# Patient Record
Sex: FEMALE | Race: BLACK OR AFRICAN AMERICAN | NOT HISPANIC OR LATINO | Employment: STUDENT | URBAN - METROPOLITAN AREA
[De-identification: names, ages, dates, MRNs, and addresses within clinical notes are randomized per-mention and may not be internally consistent; named-entity substitution may affect disease eponyms.]

---

## 2017-02-16 ENCOUNTER — APPOINTMENT (OUTPATIENT)
Dept: RADIOLOGY | Facility: MEDICAL CENTER | Age: 27
End: 2017-02-16
Attending: EMERGENCY MEDICINE
Payer: MEDICAID

## 2017-02-16 ENCOUNTER — HOSPITAL ENCOUNTER (EMERGENCY)
Facility: MEDICAL CENTER | Age: 27
End: 2017-02-17
Attending: EMERGENCY MEDICINE
Payer: MEDICAID

## 2017-02-16 DIAGNOSIS — T14.8XXA MUSCLE STRAIN: ICD-10-CM

## 2017-02-16 PROCEDURE — 700102 HCHG RX REV CODE 250 W/ 637 OVERRIDE(OP): Performed by: EMERGENCY MEDICINE

## 2017-02-16 PROCEDURE — 99284 EMERGENCY DEPT VISIT MOD MDM: CPT

## 2017-02-16 PROCEDURE — A9270 NON-COVERED ITEM OR SERVICE: HCPCS | Performed by: EMERGENCY MEDICINE

## 2017-02-16 RX ORDER — IBUPROFEN 600 MG/1
600 TABLET ORAL ONCE
Status: COMPLETED | OUTPATIENT
Start: 2017-02-17 | End: 2017-02-16

## 2017-02-16 RX ADMIN — IBUPROFEN 600 MG: 600 TABLET, FILM COATED ORAL at 23:50

## 2017-02-16 ASSESSMENT — PAIN SCALES - GENERAL
PAINLEVEL_OUTOF10: 8
PAINLEVEL_OUTOF10: 8

## 2017-02-16 NOTE — ED AVS SNAPSHOT
Home Care Instructions                                                                                                                Senia Tarango   MRN: [id number]    Department:  [facility], Emergency Dept   Date of Visit:  2/16/2017            [facility], Emergency Dept    [address] [place] [address]    Phone:  [phone]      You were seen by     Perry Nye M.D.      Your Diagnosis Was     Muscle strain     T14.8       These are the medications you received during your hospitalization from 02/16/2017 2202 to 02/17/2017 0100     Date/Time Order Dose Route Action    02/16/2017 2350 ibuprofen (MOTRIN) tablet 600 mg 600 mg Oral Given      Follow-up Information     1. Follow up with Pcp Pt States None.    Specialty:  Family Medicine    Why:  please continue use of Tylenol, NSAIDs, heat, ice, massage for symptom control. Please return to ER with any changes or worsening. Please follow up with her primary care physician if any prolonged discomfort      Medication Information     Review all of your home medications and newly ordered medications with your primary doctor and/or pharmacist as soon as possible. Follow medication instructions as directed by your doctor and/or pharmacist.     Please keep your complete medication list with you and share with your physician. Update the information when medications are discontinued, doses are changed, or new medications (including over-the-counter products) are added; and carry medication information at all times in the event of emergency situations.               Medication List      Notice     You have not been prescribed any medications.            Procedures and tests performed during your visit     LE VENOUS DUPLEX    US-EXTREMITY VENOUS UNILATERAL-LOWER RIGHT        Discharge Instructions       Muscle Strain  A muscle strain is an injury that occurs when a muscle is stretched beyond its normal  length. Usually a small number of muscle fibers are torn when this happens. Muscle strain is rated in degrees. First-degree strains have the least amount of muscle fiber tearing and pain. Second-degree and third-degree strains have increasingly more tearing and pain.   Usually, recovery from muscle strain takes 1-2 weeks. Complete healing takes 5-6 weeks.   CAUSES   Muscle strain happens when a sudden, violent force placed on a muscle stretches it too far. This may occur with lifting, sports, or a fall.   RISK FACTORS  Muscle strain is especially common in athletes.   SIGNS AND SYMPTOMS  At the site of the muscle strain, there may be:  · Pain.  · Bruising.  · Swelling.  · Difficulty using the muscle due to pain or lack of normal function.  DIAGNOSIS   Your health care provider will perform a physical exam and ask about your medical history.  TREATMENT   Often, the best treatment for a muscle strain is resting, icing, and applying cold compresses to the injured area.    HOME CARE INSTRUCTIONS   · Use the PRICE method of treatment to promote muscle healing during the first 2-3 days after your injury. The PRICE method involves:  ¨ Protecting the muscle from being injured again.  ¨ Restricting your activity and resting the injured body part.  ¨ Icing your injury. To do this, put ice in a plastic bag. Place a towel between your skin and the bag. Then, apply the ice and leave it on from 15-20 minutes each hour. After the third day, switch to moist heat packs.  ¨ Apply compression to the injured area with a splint or elastic bandage. Be careful not to wrap it too tightly. This may interfere with blood circulation or increase swelling.  ¨ Elevate the injured body part above the level of your heart as often as you can.  · Only take over-the-counter or prescription medicines for pain, discomfort, or fever as directed by your health care provider.  · Warming up prior to exercise helps to prevent future muscle strains.  SEEK  MEDICAL CARE IF:   · You have increasing pain or swelling in the injured area.  · You have numbness, tingling, or a significant loss of strength in the injured area.  MAKE SURE YOU:   · Understand these instructions.  · Will watch your condition.  · Will get help right away if you are not doing well or get worse.     This information is not intended to replace advice given to you by your health care provider. Make sure you discuss any questions you have with your health care provider.     Document Released: 12/18/2006 Document Revised: 10/08/2014 Document Reviewed: 07/17/2014  [employer] Interactive Patient Education ©2016 [employer]            Patient Information     Patient Information    Following emergency treatment: all patient requiring follow-up care must return either to a private physician or a clinic if your condition worsens before you are able to obtain further medical attention, please return to the emergency room.     Billing Information    At [facility], we work to make the billing process streamlined for our patients.  Our Representatives are here to answer any questions you may have regarding your hospital bill.  If you have insurance coverage and have supplied your insurance information to us, we will submit a claim to your insurer on your behalf.  Should you have any questions regarding your bill, we can be reached online or by phone as follows:  Online: You are able pay your bills online or live chat with our representatives about any billing questions you may have. We are here to help Monday - Friday from 8:00am to 7:30pm and 9:00am - 12:00pm on Saturdays.  Please visit [email or web address]  for more information.   Phone:  [phone] or [phone]    Please note that your emergency physician, surgeon, pathologist, radiologist, anesthesiologist, and other specialists are not employed by [facility] and will therefore bill separately for their services.   Please contact them directly for any questions concerning their bills at the numbers below:     Emergency Physician Services:  [phone]  [facility]:  [phone]  [facility]:  [phone]  [facility]:  [phone]    1. Your final bill may vary from the amount quoted upon discharge if all procedures are not complete at that time, or if your doctor has additional procedures of which we are not aware. You will receive an additional bill if you return to the Emergency Department at [facility] for suture removal regardless of the facility of which the sutures were placed.     2. Please arrange for settlement of this account at the emergency registration.    3. All self-pay accounts are due in full at the time of treatment.  If you are unable to meet this obligation then payment is expected within 4-5 days.     4. If you have had radiology studies (CT, X-ray, Ultrasound, MRI), you have received a preliminary result during your emergency department visit. Please contact the radiology department [phone] to receive a copy of your final result. Please discuss the Final result with your primary physician or with the follow up physician provided.     Crisis Hotline:  [place] Crisis Hotline:  6-002-ULWEJEJ or 1-952.813.5953  [place] Crisis Hotline:    1-791.815.9912 or 659-177-3821         ED Discharge Follow Up Questions    1. In order to provide you with very good care, we would like to follow up with a phone call in the next few days.  May we have your permission to contact you?     YES /  NO    2. What is the best phone number to call you? (       )_____-__________    3. What is the best time to call you?      Morning  /  Afternoon  /  Evening                   Patient Signature:  ____________________________________________________________    Date:  ____________________________________________________________

## 2017-02-16 NOTE — ED AVS SNAPSHOT
BECC Access Code: [email or web address]  Expires: 3/19/2017  1:00 AM    BECC  A secure, online tool to manage your health information     [employer] BECC® is a secure, online tool that connects you to your personalized health information from the privacy of your home -- day or night - making it very easy for you to manage your healthcare. Once the activation process is completed, you can even access your medical information using the BECC gildardo, which is available for free in the [employer] store or Google Play store.     BECC provides the following levels of access (as shown below):   My Chart Features   [facility] Primary Care Doctor [facility]  Specialists [facility]  Urgent  Care Non-[facility]  Primary Care  Doctor   Email your healthcare team securely and privately 24/7 X X X X   Manage appointments: schedule your next appointment; view details of past/upcoming appointments X      Request prescription refills. X      View recent personal medical records, including lab and immunizations X X X X   View health record, including health history, allergies, medications X X X X   Read reports about your outpatient visits, procedures, consult and ER notes X X X X   See your discharge summary, which is a recap of your hospital and/or ER visit that includes your diagnosis, lab results, and care plan. X X       How to register for BECC:  1. Go to  [email or web address].  2. Click on the Sign Up Now box, which takes you to the New Member Sign Up page. You will need to provide the following information:  a. Enter your BECC Access Code exactly as it appears at the top of this page. (You will not need to use this code after you’ve completed the sign-up process. If you do not sign up before the expiration date, you must request a new code.)   b. Enter your date of birth.   c. Enter your home email address.   d. Click Submit, and follow the next screen’s instructions.  3. Create a BECC ID. This will be your BECC  login ID and cannot be changed, so think of one that is secure and easy to remember.  4. Create a [employer] password. You can change your password at any time.  5. Enter your Password Reset Question and Answer. This can be used at a later time if you forget your password.   6. Enter your e-mail address. This allows you to receive e-mail notifications when new information is available in [employer].  7. Click Sign Up. You can now view your health information.    For assistance activating your [employer] account, call [phone]

## 2017-02-16 NOTE — ED AVS SNAPSHOT
2/17/2017          Senia Tarango  [address]  [place] [address]    Dear Senia:    Critical access hospital wants to ensure your discharge home is safe and you or your loved ones have had all your questions answered regarding your care after you leave the hospital.    You may receive a telephone call within two days of your discharge.  This call is to make certain you understand your discharge instructions as well as ensure we provided you with the best care possible during your stay with us.     The call will only last approximately 3-5 minutes and will be done by a nurse.    Once again, we want to ensure your discharge home is safe and that you have a clear understanding of any next steps in your care.  If you have any questions or concerns, please do not hesitate to contact us, we are here for you.  Thank you for choosing [facility] for your healthcare needs.    Sincerely,    Stef Finch    [facility]

## 2017-02-17 VITALS
OXYGEN SATURATION: 98 % | BODY MASS INDEX: 27.02 KG/M2 | WEIGHT: 172.18 LBS | HEIGHT: 67 IN | HEART RATE: 63 BPM | RESPIRATION RATE: 18 BRPM | TEMPERATURE: 98.3 F | SYSTOLIC BLOOD PRESSURE: 111 MMHG | DIASTOLIC BLOOD PRESSURE: 56 MMHG

## 2017-02-17 PROCEDURE — 99284 EMERGENCY DEPT VISIT MOD MDM: CPT

## 2017-02-17 PROCEDURE — 93971 EXTREMITY STUDY: CPT | Mod: RT

## 2017-02-17 NOTE — ED PROVIDER NOTES
"ED Provider Note    CHIEF COMPLAINT  Chief Complaint   Patient presents with   • Leg Pain     rt       HPI  Senia Tarango is a 26 y.o. female who presents with 3 days of right thigh discomfort. Past medical history largely benign. She is on Depo-Provera and smokes one half pack of cigarettes per day. 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 Over the last 3 days she's had persistent left lateral posterior thigh discomfort. No notable aggravating or relieving factors including movement of the extremity. No swelling. She has not used any over-the-counter medications to decrease her discomfort. Pain is currently minimal. No numbness or tingling. No known trauma or injury or strain. No prior history of same. Note neck or back pain. No fevers or chills. No IV drug abuse.    REVIEW OF SYSTEMS  See HPI for further details. All other systems are negative.     PAST MEDICAL HISTORY   has a past medical history of Anxiety (Dx'd 2011) and Pregnant.    SOCIAL HISTORY  Social History     Social History Main Topics   • Smoking status: Current Every Day Smoker -- 0.25 packs/day for 8 years     Types: Cigarettes     Last Attempt to Quit: 11/01/2011   • Smokeless tobacco: Never Used   • Alcohol Use: Yes      Comment: socially   • Drug Use: No   • Sexual Activity:     Partners: Female      Comment: no       SURGICAL HISTORY   has past surgical history that includes finger orif (3/23/2015) and hardware removal ortho (Right, 5/29/2015).    CURRENT MEDICATIONS  Home Medications     Reviewed by Marielena Holloway R.N. (Registered Nurse) on 02/16/17 at 2327  Med List Status: Complete    Medication Last Dose Status          Patient Prateek Taking any Medications                        ALLERGIES  Allergies   Allergen Reactions   • Nkda [No Known Drug Allergy]        PHYSICAL EXAM  VITAL SIGNS: /56 mmHg  Pulse 63  Temp(Src) 36.7 °C (98.1 °F)  Resp 16  Ht 1.689 m (5' 6.5\")  Wt 78.1 kg (172 lb 2.9 " oz)  BMI 27.38 kg/m2  SpO2 98%  LMP 10/16/2011   Pulse ox interpretation: I interpret this pulse ox as normal.  Constitutional: Alert in no apparent distress.  HENT: Normocephalic, Atraumatic, Bilateral external ears normal. Nose normal.   Eyes: Pupils are equal and reactive. Conjunctiva normal, non-icteric.   Heart: Regular rate and rythm, no murmurs.    Lungs: Clear to auscultation bilaterally.  Skin: Warm, Dry, No erythema, No rash.   Extremities: Right lower extremity: Nontender L-spine, right SI, nontender but ox. Minimal posterior thigh discomfort more proximally than distally. Minimal proximal calf tenderness. No leg swelling. Distal neurovascular intact. Full range of motion of ankle, knee and hip.  Neurologic: Alert, Grossly non-focal.   Psychiatric: Affect normal, Judgment normal, Mood normal, Appears appropriate and not intoxicated.           COURSE & MEDICAL DECISION MAKING  Pertinent Labs & Imaging studies reviewed. (See chart for details)  Patient presented to the emergency department complaining of posterior right thigh discomfort. History of physical exam as above. Exam and story not consistent with sciatic etiology. No known or notable trauma, injury or strain however given her career I believe there is a possibility of muscular strain that might have otherwise gone unnoticed. Ultrasound of the right lower cavity was completely given her risk factors including being overweight, smoking and Depo. Ultrasound was negative. Patient states that she has minimal relief with IV Profen here although continued to rest comfortably. I will have her continue with anti-inflammatories over-the-counter and she can follow up with primary cousin for evaluation of a care next week as understanding of return precautions if needed.       The patient will return for worsening symptoms and is stable at the time of discharge. The patient verbalizes understanding and will comply.    FINAL IMPRESSION  1. Muscle strain                Electronically signed by: Perry Nye, 2/16/2017 11:47 PM

## 2017-02-17 NOTE — ED NOTES
"Chief Complaint   Patient presents with   • Leg Pain     rt     /56 mmHg  Pulse 72  Temp(Src) 36.7 °C (98.1 °F)  Resp 16  Ht 1.689 m (5' 6.5\")  Wt 78.1 kg (172 lb 2.9 oz)  BMI 27.38 kg/m2  SpO2 99%  LMP 10/16/2011    "

## 2017-02-17 NOTE — ED NOTES
"Pt assessed, chart reviewed. Pt presents with right posterior upper thigh pain that began 3 days ago. Pt describes it as \"sharp and goes down my leg.\" Pt denies back pain, no hx sciatica. Pt had no trauma to the leg. No swelling or erythema noted, pedal and popliteal pulses 2+, CMS intact. Awaiting EP eval, no additional needs at this time   "

## 2017-02-17 NOTE — DISCHARGE INSTRUCTIONS
Muscle Strain  A muscle strain is an injury that occurs when a muscle is stretched beyond its normal length. Usually a small number of muscle fibers are torn when this happens. Muscle strain is rated in degrees. First-degree strains have the least amount of muscle fiber tearing and pain. Second-degree and third-degree strains have increasingly more tearing and pain.   Usually, recovery from muscle strain takes 1-2 weeks. Complete healing takes 5-6 weeks.   CAUSES   Muscle strain happens when a sudden, violent force placed on a muscle stretches it too far. This may occur with lifting, sports, or a fall.   RISK FACTORS  Muscle strain is especially common in athletes.   SIGNS AND SYMPTOMS  At the site of the muscle strain, there may be:  · Pain.  · Bruising.  · Swelling.  · Difficulty using the muscle due to pain or lack of normal function.  DIAGNOSIS   Your health care provider will perform a physical exam and ask about your medical history.  TREATMENT   Often, the best treatment for a muscle strain is resting, icing, and applying cold compresses to the injured area.    HOME CARE INSTRUCTIONS   · Use the PRICE method of treatment to promote muscle healing during the first 2-3 days after your injury. The PRICE method involves:  ¨ Protecting the muscle from being injured again.  ¨ Restricting your activity and resting the injured body part.  ¨ Icing your injury. To do this, put ice in a plastic bag. Place a towel between your skin and the bag. Then, apply the ice and leave it on from 15-20 minutes each hour. After the third day, switch to moist heat packs.  ¨ Apply compression to the injured area with a splint or elastic bandage. Be careful not to wrap it too tightly. This may interfere with blood circulation or increase swelling.  ¨ Elevate the injured body part above the level of your heart as often as you can.  · Only take over-the-counter or prescription medicines for pain, discomfort, or fever as directed by your  health care provider.  · Warming up prior to exercise helps to prevent future muscle strains.  SEEK MEDICAL CARE IF:   · You have increasing pain or swelling in the injured area.  · You have numbness, tingling, or a significant loss of strength in the injured area.  MAKE SURE YOU:   · Understand these instructions.  · Will watch your condition.  · Will get help right away if you are not doing well or get worse.     This information is not intended to replace advice given to you by your health care provider. Make sure you discuss any questions you have with your health care provider.     Document Released: 12/18/2006 Document Revised: 10/08/2014 Document Reviewed: 07/17/2014  [employer] Interactive Patient Education ©2016 [employer]

## 2017-02-17 NOTE — ED NOTES
Pt returned to waiting room pending bed availability. Encouraged to notify staff of any chgs in status. Warm blankets given in triage per pt request

## 2017-09-12 ENCOUNTER — TELEPHONE (OUTPATIENT)
Dept: RADIOLOGY | Facility: MEDICAL CENTER | Age: 27
End: 2017-09-12

## 2017-09-13 ENCOUNTER — HOSPITAL ENCOUNTER (OUTPATIENT)
Dept: RADIOLOGY | Facility: MEDICAL CENTER | Age: 27
End: 2017-09-13
Attending: NURSE PRACTITIONER
Payer: MEDICAID

## 2017-09-13 DIAGNOSIS — N63.0 BREAST MASS: ICD-10-CM

## 2017-09-13 PROCEDURE — 76642 ULTRASOUND BREAST LIMITED: CPT | Mod: RT

## 2017-11-15 ENCOUNTER — HOSPITAL ENCOUNTER (OUTPATIENT)
Facility: MEDICAL CENTER | Age: 27
End: 2017-11-15
Attending: SURGERY | Admitting: SURGERY
Payer: MEDICAID

## 2019-03-27 ENCOUNTER — HOSPITAL ENCOUNTER (OUTPATIENT)
Facility: MEDICAL CENTER | Age: 29
End: 2019-03-27
Admitting: STUDENT IN AN ORGANIZED HEALTH CARE EDUCATION/TRAINING PROGRAM
Payer: MEDICAID

## 2019-03-27 VITALS
SYSTOLIC BLOOD PRESSURE: 120 MMHG | BODY MASS INDEX: 28.25 KG/M2 | OXYGEN SATURATION: 93 % | HEART RATE: 78 BPM | HEIGHT: 67 IN | WEIGHT: 180 LBS | TEMPERATURE: 98.3 F | DIASTOLIC BLOOD PRESSURE: 64 MMHG

## 2019-03-27 LAB
APPEARANCE UR: CLEAR
COLOR UR AUTO: ABNORMAL
FLUAV RNA SPEC QL NAA+PROBE: NEGATIVE
FLUBV RNA SPEC QL NAA+PROBE: NEGATIVE
GLUCOSE UR QL STRIP.AUTO: NEGATIVE MG/DL
KETONES UR QL STRIP.AUTO: 40 MG/DL
LEUKOCYTE ESTERASE UR QL STRIP.AUTO: NEGATIVE
NITRITE UR QL STRIP.AUTO: NEGATIVE
PH UR STRIP.AUTO: 6 [PH]
PROT UR QL STRIP: 30 MG/DL
RBC UR QL AUTO: ABNORMAL
SP GR UR: >=1.03

## 2019-03-27 PROCEDURE — 96368 THER/DIAG CONCURRENT INF: CPT

## 2019-03-27 PROCEDURE — 87502 INFLUENZA DNA AMP PROBE: CPT

## 2019-03-27 PROCEDURE — 36415 COLL VENOUS BLD VENIPUNCTURE: CPT

## 2019-03-27 PROCEDURE — 700105 HCHG RX REV CODE 258: Performed by: STUDENT IN AN ORGANIZED HEALTH CARE EDUCATION/TRAINING PROGRAM

## 2019-03-27 PROCEDURE — 81002 URINALYSIS NONAUTO W/O SCOPE: CPT

## 2019-03-27 PROCEDURE — 700111 HCHG RX REV CODE 636 W/ 250 OVERRIDE (IP): Performed by: STUDENT IN AN ORGANIZED HEALTH CARE EDUCATION/TRAINING PROGRAM

## 2019-03-27 PROCEDURE — 96374 THER/PROPH/DIAG INJ IV PUSH: CPT

## 2019-03-27 PROCEDURE — 700105 HCHG RX REV CODE 258

## 2019-03-27 RX ORDER — ONDANSETRON 2 MG/ML
4 INJECTION INTRAMUSCULAR; INTRAVENOUS ONCE
Status: COMPLETED | OUTPATIENT
Start: 2019-03-27 | End: 2019-03-27

## 2019-03-27 RX ORDER — SODIUM CHLORIDE, SODIUM LACTATE, POTASSIUM CHLORIDE, CALCIUM CHLORIDE 600; 310; 30; 20 MG/100ML; MG/100ML; MG/100ML; MG/100ML
INJECTION, SOLUTION INTRAVENOUS
Status: COMPLETED
Start: 2019-03-27 | End: 2019-03-27

## 2019-03-27 RX ORDER — BENZONATATE 100 MG/1
100 CAPSULE ORAL 3 TIMES DAILY PRN
Qty: 30 CAP | Refills: 0 | Status: ON HOLD | OUTPATIENT
Start: 2019-03-27 | End: 2019-08-24

## 2019-03-27 RX ORDER — ONDANSETRON 4 MG/1
4 TABLET, FILM COATED ORAL EVERY 4 HOURS PRN
Qty: 20 TAB | Refills: 0 | Status: SHIPPED | OUTPATIENT
Start: 2019-03-27 | End: 2019-03-30

## 2019-03-27 RX ORDER — SODIUM CHLORIDE, SODIUM LACTATE, POTASSIUM CHLORIDE, CALCIUM CHLORIDE 600; 310; 30; 20 MG/100ML; MG/100ML; MG/100ML; MG/100ML
1000 INJECTION, SOLUTION INTRAVENOUS ONCE
Status: COMPLETED | OUTPATIENT
Start: 2019-03-27 | End: 2019-03-27

## 2019-03-27 RX ADMIN — ONDANSETRON 4 MG: 2 INJECTION INTRAMUSCULAR; INTRAVENOUS at 19:06

## 2019-03-27 RX ADMIN — SODIUM CHLORIDE, POTASSIUM CHLORIDE, SODIUM LACTATE AND CALCIUM CHLORIDE 1000 ML: 600; 310; 30; 20 INJECTION, SOLUTION INTRAVENOUS at 18:52

## 2019-03-27 RX ADMIN — SODIUM CHLORIDE, POTASSIUM CHLORIDE, SODIUM LACTATE AND CALCIUM CHLORIDE 1000 ML: 600; 310; 30; 20 INJECTION, SOLUTION INTRAVENOUS at 19:46

## 2019-03-28 NOTE — PROGRESS NOTES
EDC- , EGA- 26.6    - Pt arrived to L&D with c/o n/v that started yesterday along with lower abdominal cramping that started today.  Pt reports cramps last less than 30 seconds when they occur, are painful, but is unable to determine how often she has them.  Pt also c/o cough X 3 days.  Pt reports she had her glucose tolerance test yesterday.  Reports +FM, denies LOF or VB.  EFM/TOCO applied, VSS.  Urine sample obtained.  - Report to Dr. Mckeon via phone, orders received.  - IV started, fluid bolus started.  - Report to JOSELIN Tierney.

## 2019-03-28 NOTE — DISCHARGE INSTRUCTIONS
Pre-term Labor (<37 weeks):  Call your physician or return to the hospital if:  · You have painless regular contractions more than 4 in one hour.  · Your water breaks (remember time and color).  · You have menstrual-like cramps, a low dull backache or pressure in your pelvis or back.  · Your baby does not move enough to complete the daily kick count (10 movements in 2 hours).  · Your baby moves much less often than on the days before or you have not felt your baby move all day.  · Please review the MEDICATION LIST section of your AFTER VISIT SUMMARY document.  · Take your medication as prescribed  ·   General Instructions:  · If you think you are in labor, time contractions (lying on your left side) from the beginning of one contraction to the beginning of the next contraction for at least one hour.  · Increase fluid intake: you should consume 10-12 8 oz glasses of non-caffeinated fluid per day.  · Report any pressure or burning on urination to your physician.  · Monitor fetal movement: If you notice an absence or decrease in fetal movement, drink a large glass of water and rest on your side.  If there is no increase in movement, call your physician or go to the hospital for further evaluation.  · Report any sudden, sharp abdominal pain.  · Report any bleeding.  Spotting or pinkish discharge is normal after vaginal exam.  You may also spot after sexual intercourse.    Take prescribed medications as directed.   Other Instructions:  Please carefully review your entire AFTER VISIT SUMMARY document for all discharge instructions.

## 2019-03-28 NOTE — PROGRESS NOTES
1900 Report received, pt care assumed.   1940 Pt reports nausea is better after medication  2030 Pt reports she is feeling better. No pain at this time. Dr. Mckeon notified Flu swab negative and pt feeling better.  2045 Pt given written and verbal discharge instructions including PTL warnings, and [facility] instructions, pt verbalized understanding.   2046 IV d/c'd 20G cath intact.   2102 Pt ambulated off unit in stable condition with significant other.

## 2019-03-28 NOTE — PROGRESS NOTES
"[facility] LABOR AND DELIVERY TRIAGE PROGRESS NOTE    PATIENT ID:  NAME:  Senia Tarango  MRN:               [id number]  YOB: 1990     29 y.o. female  at 26w6d.    Subjective: Pt presents with a three day history of non-productive cough followed by two days of nausea, vomiting and some abdominal cramping. Per patient cramps are sharp and last seconds. No pressure and does not feel like contractions. Has had poor appetite and could not even keep water down today and felt slight headache today. States she has had some increased frequency of stooling but stools are not loose. Denies fevers/chills, chest pain, palpitations, new swelling in hands or feet, no blurry vision, no dysuria, no frequency, no hematuria. No sick contacts.     Pregnancy complicated by premature contractions. Patient seen at triage at [facility] and received Terbutaline for contractions per clinic chart review. Patient does not have any prenatal labs in clinic chart but states that she got her labs and GGT done yesterday.     negative  For CTXS.   negative Feels pain   negative for LOF  negative for vaginal bleeding.   positive for fetal movement    ROS: As per HPI.     Objective:    Vitals:    19 1817 19 1858 19 1908   BP: 119/59 120/64    Pulse: 85 69    Temp: 36.7 °C (98.1 °F)  36.8 °C (98.3 °F)   TempSrc: Temporal  Temporal   Weight: 81.6 kg (180 lb)     Height: 1.689 m (5' 6.5\")       Temp (24hrs), Av.8 °C (98.2 °F), Min:36.7 °C (98.1 °F), Max:36.8 °C (98.3 °F)    General: Appears uncomfortable but not toxic  HEENT: normocephalic, nontraumatic, PERRLA, EOMI, MMM, oropharynx without erythema or exudate  Cardiovascular: Heart RRR with no murmurs, rubs or gallops. Radial Pulses 2+, no peripheral edema  Respiratory: symmetric chest expansion, lungs CTAB, with no wheezes, rales, rhonci  Abdomen: gravid, nontender, + BS, no rebound or guarding  Neuro:CN 2-12 grossly intact    Cervix:  Not checked  Clint: Uterine " Irritability  FHRM: Baseline 150's-160's, +Accels, no decels, moderate variability    Assessment: 29 y.o. female  at 26w6d. Presents with upper respiratory symptoms, nausea and vomiting, no diarrhea. Likely viral infection.   UA consistent with dehydration. Likely due to vomiting. (+ketonuria, +proteinuria, trace blood, elevated SG)  No signs or symptoms of UTI.   Uterine irritability but no discrete contractions on TOCO.  FHT: 150's-160's bpm, +accels, no decels, moderate variability.   Patient is afebrile with normal and stable vital signs. 02sat 97% in RA.  Lungs sound clear on exam.  Flu negative.       Plan:   1. Will give IVF. Zofran for nausea, Tessalon for cough.  2. Discharge home with return precautions and instructions to increase hydration.         Discussed case with Dr. Atkinson, Attending. Case was discussed and attending agreed with plan prior to discharge of patient.

## 2019-04-15 ENCOUNTER — HOSPITAL ENCOUNTER (OUTPATIENT)
Facility: MEDICAL CENTER | Age: 29
End: 2019-04-15
Admitting: STUDENT IN AN ORGANIZED HEALTH CARE EDUCATION/TRAINING PROGRAM
Payer: MEDICAID

## 2019-04-15 VITALS
TEMPERATURE: 98 F | SYSTOLIC BLOOD PRESSURE: 111 MMHG | WEIGHT: 180 LBS | DIASTOLIC BLOOD PRESSURE: 57 MMHG | HEART RATE: 63 BPM | HEIGHT: 66 IN | BODY MASS INDEX: 28.93 KG/M2

## 2019-04-15 LAB
APPEARANCE UR: CLEAR
COLOR UR AUTO: YELLOW
FIBRONECTIN FETAL SPEC QL: NEGATIVE
GLUCOSE UR QL STRIP.AUTO: NEGATIVE MG/DL
KETONES UR QL STRIP.AUTO: NEGATIVE MG/DL
LEUKOCYTE ESTERASE UR QL STRIP.AUTO: NEGATIVE
NITRITE UR QL STRIP.AUTO: NEGATIVE
PH UR STRIP.AUTO: 7 [PH]
PROT UR QL STRIP: NEGATIVE MG/DL
RBC UR QL AUTO: NEGATIVE
SP GR UR: 1.01

## 2019-04-15 PROCEDURE — 82731 ASSAY OF FETAL FIBRONECTIN: CPT

## 2019-04-15 PROCEDURE — 59025 FETAL NON-STRESS TEST: CPT

## 2019-04-15 PROCEDURE — 81002 URINALYSIS NONAUTO W/O SCOPE: CPT

## 2019-04-16 ASSESSMENT — ENCOUNTER SYMPTOMS
FEVER: 0
VOMITING: 0
BLURRED VISION: 0
NAUSEA: 0

## 2019-04-16 NOTE — PROGRESS NOTES
2000- Sterile spec done, no pooling of fluids noted. FFN collected and sent. SVE Closed/ thick/ high.

## 2019-04-16 NOTE — DISCHARGE INSTRUCTIONS
General Instructions:  · If you think you are in labor, time contractions (lying on your left side) from the beginning of one contraction to the beginning of the next contraction for at least one hour.  · Increase fluid intake: you should consume 10-12 8 oz glasses of non-caffeinated fluid per day.  · Report any pressure or burning on urination to your physician.  · Monitor fetal movement: If you notice an absence or decrease in fetal movement, drink a large glass of water and rest on your side.  If there is no increase in movement, call your physician or go to the hospital for further evaluation.  · Report any sudden, sharp abdominal pain.  · Report any bleeding.  Spotting or pinkish discharge is normal after vaginal exam.  You may also spot after sexual intercourse.    Labor Instructions (37 - 39 weeks):  Call your physician or return to hospital if:  · You have regular contractions that get progressively closer, longer and stronger.  · Your water breaks (remember time and color).  · You have bleeding like a period.  · Your baby does not move enough to complete the daily kick counts (10 movements in 2 hours)  · Your baby moves much less often than on the days before or you have not felt your baby move all day.    Pre-term Labor (<37 weeks):  Call your physician or return to the hospital if:  · You have painless regular contractions more than 4 in one hour.  · Your water breaks (remember time and color).  · You have menstrual-like cramps, a low dull backache or pressure in your pelvis or back.  · Your baby does not move enough to complete the daily kick count (10 movements in 2 hours).  · Your baby moves much less often than on the days before or you have not felt your baby move all day.  · Please review the MEDICATION LIST section of your AFTER VISIT SUMMARY document.  · Take your medication as prescribed      Other Instructions:  Please carefully review your entire AFTER VISIT SUMMARY document for all discharge  instructions.

## 2019-04-16 NOTE — PROGRESS NOTES
Pt is a ; UNIQUE of ; making her 39w4d. Pt here c/o cramping the last three days and loss of mucous discharge. Denies LOF, VB and reports +FM. Pt unable to void at this time. VSS.     Dr العراقي notified. Orders for FFN and SVE. IJEOMA Carlson at bedside. FFN collected and sent to lab. SVE at Closed/Thick/high.    Dr العراقي updated.     FFN negative. Dr العراقي notified. Orders to discharge pt home.     General discharge instructions, PTL precautions and follow up with Dr Obregon (pt establishing care) this Thursday discussed with pt. All questions answered at this time. PT signed discharge instructions and ambulated out in stable condition with FOB at side.

## 2019-04-16 NOTE — PROGRESS NOTES
"  [facility] LABOR AND DELIVERY TRIAGE PROGRESS NOTE    PATIENT ID:  NAME:  Senia Tarango  MRN:               [id number]  YOB: 1990    ID: This is a 29 year old  at 29w4d who presented to triage for abdominal pain.   Patient has received prenatal care with [facility] [place] [facility] but has been inconsistent with her care and states she is planning on transferring to Dr. Obregon.   Pregnancy complicated by concern for  labor. Patient seen at triage at [facility] and received Terbutaline for contractions per clinic chart review. Patient does not have any prenatal labs in clinic chart but states that she got her labs and GGT done yesterday.     Subjective: Pt feeling better now, states crampey lower pain. Not like labor.     negative  For CTXS.   positive Feels pain as above  negative for LOF  negative for vaginal bleeding.   positive for fetal movement    ROS: Review of Systems   Constitutional: Negative for fever.   Eyes: Negative for blurred vision.   Cardiovascular: Negative for chest pain.   Gastrointestinal: Negative for nausea and vomiting.       Objective:    Vitals:    04/15/19 1800 04/15/19 1906   BP:  111/57   Pulse:  63   Temp:  36.7 °C (98 °F)   Weight: 81.6 kg (180 lb)    Height: 1.689 m (5' 6.5\")      Temp (24hrs), Av.7 °C (98 °F), Min:36.7 °C (98 °F), Max:36.7 °C (98 °F)    General: No acute distress, resting comfortably in bed.  HEENT: normocephalic, nontraumatic, PERRLA, EOMI  Cardiovascular: Heart RRR with no murmurs, rubs or gallops. Distal Pulses 2+  Respiratory: symmetric chest expansion, lungs CTAB, with no wheezes, rales, rhonci  Abdomen: gravid, nontender  Musculoskeletal: strength 5/5 in four extremities  Neuro: non focal with no numbness, tingling or changes in sensation    Cervix:  Closed thick and high (RN Exam )  [place]: Uterine Contractions none   FHRM: Reactive NST    Assessment: 29F here for abdominal pain that has resolved. The patient has had much discomfort this " pregnancy and has bee treated for  labor x1 at [facility]. No history of  deliveries. FFN negative.     Plan:   1. Patient is cleared to return home with family. Encouraged to see MD for increased painful uterine contractions @ 3-5, vaginal bleeding, loss of fluid, or other serious symptoms.  2. Asked patient to fill out release of records at [facility] as she states she is transferring care.     Discussed case with Aissatou Henry CNM. Case was discussed and attending agreed with plan prior to discharge of patient.    Roldan العراقي M.D.   PGY-2  [facility]   [phone]

## 2019-05-09 ENCOUNTER — HOSPITAL ENCOUNTER (OUTPATIENT)
Facility: MEDICAL CENTER | Age: 29
End: 2019-05-09
Attending: SPECIALIST | Admitting: SPECIALIST
Payer: MEDICAID

## 2019-05-09 VITALS — DIASTOLIC BLOOD PRESSURE: 66 MMHG | SYSTOLIC BLOOD PRESSURE: 110 MMHG | HEART RATE: 80 BPM | TEMPERATURE: 97.4 F

## 2019-05-09 LAB
APPEARANCE UR: CLEAR
COLOR UR AUTO: YELLOW
GLUCOSE UR QL STRIP.AUTO: NEGATIVE MG/DL
KETONES UR QL STRIP.AUTO: 15 MG/DL
LEUKOCYTE ESTERASE UR QL STRIP.AUTO: NEGATIVE
NITRITE UR QL STRIP.AUTO: NEGATIVE
PH UR STRIP.AUTO: 7 [PH]
PROT UR QL STRIP: NEGATIVE MG/DL
RBC UR QL AUTO: NEGATIVE
SP GR UR: 1.02

## 2019-05-09 PROCEDURE — 59025 FETAL NON-STRESS TEST: CPT

## 2019-05-09 PROCEDURE — 81002 URINALYSIS NONAUTO W/O SCOPE: CPT

## 2019-05-09 NOTE — PROGRESS NOTES
29 y.o.    Edc=  33 weeks    TOCO and US on.  VSS.  Pt presents to triage with c/o pain, specifically hip pain and pressure.  She denies VB or LOF and states pos FM.  UA done=small ketones, SG=1.025.  Pt currently sipping on water.  1610-Reactive NST, 1 contraction seen.  Dr. Obregon called and report given on the above stated.  Orders to dc pt home with comfort measures, proper hydration education and general precautions.  Pt states understanding, has follow up appoint on .

## 2019-05-30 ENCOUNTER — HOSPITAL ENCOUNTER (OUTPATIENT)
Facility: MEDICAL CENTER | Age: 29
End: 2019-05-30
Attending: SPECIALIST | Admitting: SPECIALIST
Payer: MEDICAID

## 2019-05-30 VITALS
SYSTOLIC BLOOD PRESSURE: 117 MMHG | BODY MASS INDEX: 31.98 KG/M2 | DIASTOLIC BLOOD PRESSURE: 71 MMHG | WEIGHT: 199 LBS | HEART RATE: 68 BPM | HEIGHT: 66 IN

## 2019-05-30 LAB
APPEARANCE UR: ABNORMAL
COLOR UR AUTO: ABNORMAL
GLUCOSE UR QL STRIP.AUTO: NEGATIVE MG/DL
KETONES UR QL STRIP.AUTO: >=160 MG/DL
LEUKOCYTE ESTERASE UR QL STRIP.AUTO: NEGATIVE
NITRITE UR QL STRIP.AUTO: NEGATIVE
PH UR STRIP.AUTO: 6.5 [PH]
PROT UR QL STRIP: NEGATIVE MG/DL
RBC UR QL AUTO: ABNORMAL
SP GR UR: 1.02

## 2019-05-30 PROCEDURE — 59025 FETAL NON-STRESS TEST: CPT

## 2019-05-30 PROCEDURE — 700105 HCHG RX REV CODE 258: Performed by: OBSTETRICS & GYNECOLOGY

## 2019-05-30 PROCEDURE — 81002 URINALYSIS NONAUTO W/O SCOPE: CPT

## 2019-05-30 PROCEDURE — 36415 COLL VENOUS BLD VENIPUNCTURE: CPT

## 2019-05-30 RX ORDER — DEXTROSE, SODIUM CHLORIDE, SODIUM LACTATE, POTASSIUM CHLORIDE, AND CALCIUM CHLORIDE 5; .6; .31; .03; .02 G/100ML; G/100ML; G/100ML; G/100ML; G/100ML
INJECTION, SOLUTION INTRAVENOUS CONTINUOUS
Status: DISCONTINUED | OUTPATIENT
Start: 2019-05-30 | End: 2019-05-31 | Stop reason: HOSPADM

## 2019-05-30 RX ORDER — SODIUM CHLORIDE, SODIUM LACTATE, POTASSIUM CHLORIDE, CALCIUM CHLORIDE 600; 310; 30; 20 MG/100ML; MG/100ML; MG/100ML; MG/100ML
1000 INJECTION, SOLUTION INTRAVENOUS ONCE
Status: COMPLETED | OUTPATIENT
Start: 2019-05-30 | End: 2019-05-30

## 2019-05-30 RX ADMIN — SODIUM CHLORIDE, SODIUM LACTATE, POTASSIUM CHLORIDE, CALCIUM CHLORIDE AND DEXTROSE MONOHYDRATE: 5; 600; 310; 30; 20 INJECTION, SOLUTION INTRAVENOUS at 19:17

## 2019-05-30 RX ADMIN — SODIUM CHLORIDE, POTASSIUM CHLORIDE, SODIUM LACTATE AND CALCIUM CHLORIDE 1000 ML: 600; 310; 30; 20 INJECTION, SOLUTION INTRAVENOUS at 17:32

## 2019-05-31 NOTE — PROGRESS NOTES
28yo, , edc6/27, 36 presents with c/o UCs that started last night and have become progressively more painful.  Pt denies LOF, vag bleeding. POS fm. EFM and [place] placed. VSS.  SVE /-2.   172 Dr. Nava updated. Orders rcvd.    IV started. Pt tolerated well.   1850 ASHLEY Rice RN, SVE /-2.   190 Pt up to BR, UA obtained. Report to JOSELIN Rasheed. POC discussed.

## 2019-05-31 NOTE — PROGRESS NOTES
1900 Report received from BRODY Franklin RN at bedside and assumed care. POC discussed with pt and s/o and encouraged to state needs or questions at any time.    1911 Dr. Nava called by BRODY Franklin RN, orders received.    2027 Dr. Nava called and given update, orders received to recheck pt and D/C if no change.    2031 SVE by SHYAM Zarate RN 2/50/floating 2046 General L&D D/C instructions reviewed with pt who states understanding. Pt d/c to self, waiting for s/o to pick her up.    2100 Pt left unit ambulatory.

## 2019-06-24 ENCOUNTER — HOSPITAL ENCOUNTER (OUTPATIENT)
Facility: MEDICAL CENTER | Age: 29
End: 2019-06-24
Attending: SPECIALIST | Admitting: SPECIALIST
Payer: MEDICAID

## 2019-06-24 VITALS
DIASTOLIC BLOOD PRESSURE: 67 MMHG | HEART RATE: 59 BPM | HEIGHT: 66 IN | RESPIRATION RATE: 14 BRPM | SYSTOLIC BLOOD PRESSURE: 114 MMHG | TEMPERATURE: 97.5 F | WEIGHT: 207 LBS | BODY MASS INDEX: 33.27 KG/M2

## 2019-06-24 PROCEDURE — 59025 FETAL NON-STRESS TEST: CPT

## 2019-06-24 NOTE — PROGRESS NOTES
Patient comes in with complaints that she has intermittent back pain, irregular contractions.  She denies leaking or bleeding and feels fetal movement.  Monitors applied.  SVE is 2/50/-2 and posterior.  Dr Obregon called.  He will review tracing when he arrives at hospital.  Discussed with Dr Obregon.  Tracing reactive, patient to be discharged.  Labor precautions given.  Questions answered.  Patient ambulated out.

## 2019-06-29 ENCOUNTER — ANESTHESIA (OUTPATIENT)
Dept: OBGYN | Facility: MEDICAL CENTER | Age: 29
End: 2019-06-29
Payer: MEDICAID

## 2019-06-29 ENCOUNTER — APPOINTMENT (OUTPATIENT)
Dept: OBGYN | Facility: MEDICAL CENTER | Age: 29
End: 2019-06-29
Attending: SPECIALIST
Payer: MEDICAID

## 2019-06-29 ENCOUNTER — ANESTHESIA EVENT (OUTPATIENT)
Dept: OBGYN | Facility: MEDICAL CENTER | Age: 29
End: 2019-06-29
Payer: MEDICAID

## 2019-06-29 ENCOUNTER — HOSPITAL ENCOUNTER (INPATIENT)
Facility: MEDICAL CENTER | Age: 29
LOS: 2 days | End: 2019-07-01
Attending: SPECIALIST | Admitting: SPECIALIST
Payer: MEDICAID

## 2019-06-29 DIAGNOSIS — R10.2 PELVIC PAIN: Primary | ICD-10-CM

## 2019-06-29 LAB
BASOPHILS # BLD AUTO: 0.4 % (ref 0–1.8)
BASOPHILS # BLD: 0.02 K/UL (ref 0–0.12)
EOSINOPHIL # BLD AUTO: 0.02 K/UL (ref 0–0.51)
EOSINOPHIL NFR BLD: 0.4 % (ref 0–6.9)
ERYTHROCYTE [DISTWIDTH] IN BLOOD BY AUTOMATED COUNT: 45.9 FL (ref 35.9–50)
HCT VFR BLD AUTO: 33.1 % (ref 37–47)
HGB BLD-MCNC: 11.1 G/DL (ref 12–16)
HOLDING TUBE BB 8507: NORMAL
IMM GRANULOCYTES # BLD AUTO: 0.02 K/UL (ref 0–0.11)
IMM GRANULOCYTES NFR BLD AUTO: 0.4 % (ref 0–0.9)
LYMPHOCYTES # BLD AUTO: 1.7 K/UL (ref 1–4.8)
LYMPHOCYTES NFR BLD: 31.2 % (ref 22–41)
MCH RBC QN AUTO: 31 PG (ref 27–33)
MCHC RBC AUTO-ENTMCNC: 33.5 G/DL (ref 33.6–35)
MCV RBC AUTO: 92.5 FL (ref 81.4–97.8)
MONOCYTES # BLD AUTO: 0.51 K/UL (ref 0–0.85)
MONOCYTES NFR BLD AUTO: 9.4 % (ref 0–13.4)
NEUTROPHILS # BLD AUTO: 3.18 K/UL (ref 2–7.15)
NEUTROPHILS NFR BLD: 58.2 % (ref 44–72)
NRBC # BLD AUTO: 0 K/UL
NRBC BLD-RTO: 0 /100 WBC
PLATELET # BLD AUTO: 148 K/UL (ref 164–446)
PMV BLD AUTO: 11.1 FL (ref 9–12.9)
RBC # BLD AUTO: 3.58 M/UL (ref 4.2–5.4)
WBC # BLD AUTO: 5.5 K/UL (ref 4.8–10.8)

## 2019-06-29 PROCEDURE — 770002 HCHG ROOM/CARE - OB PRIVATE (112)

## 2019-06-29 PROCEDURE — 85025 COMPLETE CBC W/AUTO DIFF WBC: CPT

## 2019-06-29 PROCEDURE — 10907ZC DRAINAGE OF AMNIOTIC FLUID, THERAPEUTIC FROM PRODUCTS OF CONCEPTION, VIA NATURAL OR ARTIFICIAL OPENING: ICD-10-PCS | Performed by: SPECIALIST

## 2019-06-29 PROCEDURE — 700111 HCHG RX REV CODE 636 W/ 250 OVERRIDE (IP): Performed by: ANESTHESIOLOGY

## 2019-06-29 PROCEDURE — 700111 HCHG RX REV CODE 636 W/ 250 OVERRIDE (IP)

## 2019-06-29 PROCEDURE — 700102 HCHG RX REV CODE 250 W/ 637 OVERRIDE(OP): Performed by: SPECIALIST

## 2019-06-29 PROCEDURE — 10H07YZ INSERTION OF OTHER DEVICE INTO PRODUCTS OF CONCEPTION, VIA NATURAL OR ARTIFICIAL OPENING: ICD-10-PCS | Performed by: SPECIALIST

## 2019-06-29 PROCEDURE — A9270 NON-COVERED ITEM OR SERVICE: HCPCS | Performed by: SPECIALIST

## 2019-06-29 PROCEDURE — 36415 COLL VENOUS BLD VENIPUNCTURE: CPT

## 2019-06-29 PROCEDURE — 303615 HCHG EPIDURAL/SPINAL ANESTHESIA FOR LABOR

## 2019-06-29 PROCEDURE — 59409 OBSTETRICAL CARE: CPT

## 2019-06-29 PROCEDURE — 700101 HCHG RX REV CODE 250: Performed by: ANESTHESIOLOGY

## 2019-06-29 PROCEDURE — 700111 HCHG RX REV CODE 636 W/ 250 OVERRIDE (IP): Performed by: SPECIALIST

## 2019-06-29 PROCEDURE — 700105 HCHG RX REV CODE 258: Performed by: SPECIALIST

## 2019-06-29 PROCEDURE — 304965 HCHG RECOVERY SERVICES

## 2019-06-29 PROCEDURE — 3E033VJ INTRODUCTION OF OTHER HORMONE INTO PERIPHERAL VEIN, PERCUTANEOUS APPROACH: ICD-10-PCS | Performed by: SPECIALIST

## 2019-06-29 RX ORDER — LIDOCAINE HYDROCHLORIDE AND EPINEPHRINE 15; 5 MG/ML; UG/ML
INJECTION, SOLUTION EPIDURAL PRN
Status: DISCONTINUED | OUTPATIENT
Start: 2019-06-29 | End: 2019-06-29 | Stop reason: SURG

## 2019-06-29 RX ORDER — BUPIVACAINE HYDROCHLORIDE 2.5 MG/ML
INJECTION, SOLUTION EPIDURAL; INFILTRATION; INTRACAUDAL
Status: COMPLETED
Start: 2019-06-29 | End: 2019-06-29

## 2019-06-29 RX ORDER — SODIUM CHLORIDE, SODIUM LACTATE, POTASSIUM CHLORIDE, AND CALCIUM CHLORIDE .6; .31; .03; .02 G/100ML; G/100ML; G/100ML; G/100ML
1000 INJECTION, SOLUTION INTRAVENOUS
Status: DISCONTINUED | OUTPATIENT
Start: 2019-06-29 | End: 2019-06-29 | Stop reason: HOSPADM

## 2019-06-29 RX ORDER — ROPIVACAINE HYDROCHLORIDE 2 MG/ML
INJECTION, SOLUTION EPIDURAL; INFILTRATION; PERINEURAL CONTINUOUS
Status: DISCONTINUED | OUTPATIENT
Start: 2019-06-29 | End: 2019-07-01 | Stop reason: HOSPADM

## 2019-06-29 RX ORDER — OXYCODONE AND ACETAMINOPHEN 10; 325 MG/1; MG/1
1 TABLET ORAL EVERY 4 HOURS PRN
Status: DISCONTINUED | OUTPATIENT
Start: 2019-06-29 | End: 2019-07-01 | Stop reason: HOSPADM

## 2019-06-29 RX ORDER — MISOPROSTOL 200 UG/1
800 TABLET ORAL
Status: DISCONTINUED | OUTPATIENT
Start: 2019-06-29 | End: 2019-06-29 | Stop reason: HOSPADM

## 2019-06-29 RX ORDER — METHYLERGONOVINE MALEATE 0.2 MG/ML
0.2 INJECTION INTRAVENOUS
Status: DISCONTINUED | OUTPATIENT
Start: 2019-06-29 | End: 2019-07-01 | Stop reason: HOSPADM

## 2019-06-29 RX ORDER — ROPIVACAINE HYDROCHLORIDE 2 MG/ML
INJECTION, SOLUTION EPIDURAL; INFILTRATION; PERINEURAL
Status: COMPLETED
Start: 2019-06-29 | End: 2019-06-29

## 2019-06-29 RX ORDER — DOCUSATE SODIUM 100 MG/1
100 CAPSULE, LIQUID FILLED ORAL 2 TIMES DAILY PRN
Status: DISCONTINUED | OUTPATIENT
Start: 2019-06-29 | End: 2019-07-01 | Stop reason: HOSPADM

## 2019-06-29 RX ORDER — DEXTROSE, SODIUM CHLORIDE, SODIUM LACTATE, POTASSIUM CHLORIDE, AND CALCIUM CHLORIDE 5; .6; .31; .03; .02 G/100ML; G/100ML; G/100ML; G/100ML; G/100ML
INJECTION, SOLUTION INTRAVENOUS CONTINUOUS
Status: DISCONTINUED | OUTPATIENT
Start: 2019-06-29 | End: 2019-07-01 | Stop reason: HOSPADM

## 2019-06-29 RX ORDER — SODIUM CHLORIDE, SODIUM LACTATE, POTASSIUM CHLORIDE, CALCIUM CHLORIDE 600; 310; 30; 20 MG/100ML; MG/100ML; MG/100ML; MG/100ML
INJECTION, SOLUTION INTRAVENOUS PRN
Status: DISCONTINUED | OUTPATIENT
Start: 2019-06-29 | End: 2019-07-01 | Stop reason: HOSPADM

## 2019-06-29 RX ORDER — VITAMIN A ACETATE, BETA CAROTENE, ASCORBIC ACID, CHOLECALCIFEROL, .ALPHA.-TOCOPHEROL ACETATE, DL-, THIAMINE MONONITRATE, RIBOFLAVIN, NIACINAMIDE, PYRIDOXINE HYDROCHLORIDE, FOLIC ACID, CYANOCOBALAMIN, CALCIUM CARBONATE, FERROUS FUMARATE, ZINC OXIDE, CUPRIC OXIDE 3080; 12; 120; 400; 1; 1.84; 3; 20; 22; 920; 25; 200; 27; 10; 2 [IU]/1; UG/1; MG/1; [IU]/1; MG/1; MG/1; MG/1; MG/1; MG/1; [IU]/1; MG/1; MG/1; MG/1; MG/1; MG/1
1 TABLET, FILM COATED ORAL EVERY MORNING
Status: DISCONTINUED | OUTPATIENT
Start: 2019-06-30 | End: 2019-07-01 | Stop reason: HOSPADM

## 2019-06-29 RX ORDER — OXYCODONE HYDROCHLORIDE AND ACETAMINOPHEN 5; 325 MG/1; MG/1
1 TABLET ORAL EVERY 4 HOURS PRN
Status: DISCONTINUED | OUTPATIENT
Start: 2019-06-29 | End: 2019-07-01 | Stop reason: HOSPADM

## 2019-06-29 RX ORDER — SODIUM CHLORIDE, SODIUM LACTATE, POTASSIUM CHLORIDE, CALCIUM CHLORIDE 600; 310; 30; 20 MG/100ML; MG/100ML; MG/100ML; MG/100ML
INJECTION, SOLUTION INTRAVENOUS CONTINUOUS
Status: DISPENSED | OUTPATIENT
Start: 2019-06-29 | End: 2019-06-29

## 2019-06-29 RX ORDER — BUPIVACAINE HYDROCHLORIDE 2.5 MG/ML
INJECTION, SOLUTION EPIDURAL; INFILTRATION; INTRACAUDAL PRN
Status: DISCONTINUED | OUTPATIENT
Start: 2019-06-29 | End: 2019-06-29 | Stop reason: SURG

## 2019-06-29 RX ORDER — SODIUM CHLORIDE, SODIUM LACTATE, POTASSIUM CHLORIDE, AND CALCIUM CHLORIDE .6; .31; .03; .02 G/100ML; G/100ML; G/100ML; G/100ML
250 INJECTION, SOLUTION INTRAVENOUS PRN
Status: DISCONTINUED | OUTPATIENT
Start: 2019-06-29 | End: 2019-06-29 | Stop reason: HOSPADM

## 2019-06-29 RX ORDER — IBUPROFEN 600 MG/1
600 TABLET ORAL EVERY 6 HOURS PRN
Status: DISCONTINUED | OUTPATIENT
Start: 2019-06-29 | End: 2019-07-01 | Stop reason: HOSPADM

## 2019-06-29 RX ORDER — ONDANSETRON 2 MG/ML
4 INJECTION INTRAMUSCULAR; INTRAVENOUS EVERY 6 HOURS PRN
Status: DISCONTINUED | OUTPATIENT
Start: 2019-06-29 | End: 2019-07-01 | Stop reason: HOSPADM

## 2019-06-29 RX ORDER — ONDANSETRON 4 MG/1
4 TABLET, ORALLY DISINTEGRATING ORAL EVERY 6 HOURS PRN
Status: DISCONTINUED | OUTPATIENT
Start: 2019-06-29 | End: 2019-07-01 | Stop reason: HOSPADM

## 2019-06-29 RX ADMIN — SODIUM CHLORIDE, POTASSIUM CHLORIDE, SODIUM LACTATE AND CALCIUM CHLORIDE: 600; 310; 30; 20 INJECTION, SOLUTION INTRAVENOUS at 15:58

## 2019-06-29 RX ADMIN — Medication 2 MILLI-UNITS/MIN: at 11:42

## 2019-06-29 RX ADMIN — SODIUM CHLORIDE, POTASSIUM CHLORIDE, SODIUM LACTATE AND CALCIUM CHLORIDE: 600; 310; 30; 20 INJECTION, SOLUTION INTRAVENOUS at 15:35

## 2019-06-29 RX ADMIN — ROPIVACAINE HYDROCHLORIDE 100 ML: 2 INJECTION, SOLUTION EPIDURAL; INFILTRATION; PERINEURAL at 15:58

## 2019-06-29 RX ADMIN — BUPIVACAINE HYDROCHLORIDE 5 ML: 2.5 INJECTION, SOLUTION EPIDURAL; INFILTRATION; INTRACAUDAL; PERINEURAL at 18:00

## 2019-06-29 RX ADMIN — IBUPROFEN 600 MG: 600 TABLET ORAL at 22:35

## 2019-06-29 RX ADMIN — ROPIVACAINE HYDROCHLORIDE 100 ML: 2 INJECTION, SOLUTION EPIDURAL; INFILTRATION at 15:58

## 2019-06-29 RX ADMIN — OXYCODONE HYDROCHLORIDE AND ACETAMINOPHEN 1 TABLET: 10; 325 TABLET ORAL at 23:31

## 2019-06-29 RX ADMIN — LIDOCAINE HYDROCHLORIDE,EPINEPHRINE BITARTRATE 5 ML: 15; .005 INJECTION, SOLUTION EPIDURAL; INFILTRATION; INTRACAUDAL; PERINEURAL at 16:06

## 2019-06-29 RX ADMIN — Medication 2000 ML/HR: at 18:35

## 2019-06-29 RX ADMIN — SODIUM CHLORIDE, POTASSIUM CHLORIDE, SODIUM LACTATE AND CALCIUM CHLORIDE: 600; 310; 30; 20 INJECTION, SOLUTION INTRAVENOUS at 11:42

## 2019-06-29 RX ADMIN — Medication 125 ML/HR: at 19:10

## 2019-06-29 ASSESSMENT — PATIENT HEALTH QUESTIONNAIRE - PHQ9
SUM OF ALL RESPONSES TO PHQ9 QUESTIONS 1 AND 2: 0
2. FEELING DOWN, DEPRESSED, IRRITABLE, OR HOPELESS: NOT AT ALL
1. LITTLE INTEREST OR PLEASURE IN DOING THINGS: NOT AT ALL

## 2019-06-29 ASSESSMENT — LIFESTYLE VARIABLES
EVER_SMOKED: NEVER
ALCOHOL_USE: NO

## 2019-06-29 ASSESSMENT — PAIN SCALES - GENERAL: PAIN_LEVEL: 0

## 2019-06-29 NOTE — PROGRESS NOTES
0935- 28yo  EDC 2019 40w2d presents to unit for IOL with Dr Obregon. SVE 2-3/60/-3. VSS. Pt denies LOF, VB or UC's. Pt is comfortable at this time. EFM and TOCO applied.     1000- Bedside report given to JOSELIN Rodriguez.

## 2019-06-29 NOTE — ANESTHESIA PREPROCEDURE EVALUATION
@ 40.2  Labor pain.     Relevant Problems   No relevant active problems     Vitals:    19 0937   BP: 114/67   Pulse: 60   Temp: 36 °C (96.8 °F)       Physical Exam    Airway   Mallampati: II  TM distance: >3 FB  Neck ROM: full       Cardiovascular - normal exam  Rhythm: regular  Rate: normal  (-) murmur     Dental - normal exam         Pulmonary - normal exam  Breath sounds clear to auscultation     Abdominal    Neurological - normal exam                 Anesthesia Plan    ASA 2       Plan - epidural   Neuraxial block will be labor analgesia              Pertinent diagnostic labs and testing reviewed    Informed Consent:    Anesthetic plan and risks discussed with patient.

## 2019-06-29 NOTE — ANESTHESIA PROCEDURE NOTES
Epidural Block  Performed by: JESSICA WHEAT  Authorized by: JESSICA WHEAT     Patient Location:  OB  Start Time:  6/29/2019 4:03 PM  Reason for Block: labor analgesia    patient identified, IV checked, site marked, risks and benefits discussed, surgical consent, monitors and equipment checked, pre-op evaluation and timeout performed    Patient Position:  Sitting  Prep: ChloraPrep, patient draped and sterile technique    Monitoring:  Blood pressure, continuous pulse oximetry and heart rate  Approach:  Midline  Location:  L3-L4  Injection Technique:  LYNDSEY saline  Skin infiltration:  Lidocaine  Strength:  1%  Dose:  3ml  Needle Type:  Tuohy  Needle Gauge:  17 G  Needle Length:  3.5 in  Loss of resistance::  6  Catheter Size:  19 G  Catheter at Skin Depth:  12  Test Dose:  Lidocaine 1.5% with epinephrine 1-to-200,000  Test Dose Result:  Negative

## 2019-06-29 NOTE — PROGRESS NOTES
EDC - 19 EGA - 40.2    1000 - Report received from DOM Tarango RN. FOB at bedside. POC discussed with pt and family members, all questions answered.   1040 MILLIE German RN completed admission procedures and started IV at this time.  1120 Dr. Obregon called, orders received.  1141 Pitocin started at this time, POC discussed, pt feels occasional tightening.  1300 Pt resting comfortably at this time. Pt repositioning self between ball and sides.   1400 Dr. Obregon at bedside, POC discussed with pt, plan for AROM after epidural placement, pt denies need for epidural at this time.  1556 - Dr. Hernandez at bedside. Time out called at 1556. Epidural placed at this time by Dr Hernandez. Test dose at 1606 - No reaction detected - VSS. Dermatome level of T8. Epidural infusion settings are : 10mL/hr continuous infusion, 5mL bolus every 15 minutes with a 25mL/hr dose limit.   1640 - Lieberman placed and draining to gravity.   1644 Dr. Obregon at bedside SVE 5//80/-2. AROM cleaar fluid at this time. IUPC inserted. Category 1 FHT tracing at this time. No complaints at this time.   1734 SVE 6/90/-2 Dr. Obregon notified of FHTs and SVE.  1754 Pt states she is feeling intense pain in her vagina, SVE-unchanged, bolus button pressed.  180 Dr. Mccallum called to bedside for bolus.  181 Dr. Hernandez at bedside for bolus.  181 Pitocin stopped.  1818 Pt states she is feeling a lot of pressure, SVE lip/0 Dr. Obregon notified, states he is on his way.  1828 Pt states she is feeling like she needs to push, SVE Complete/+2 Dr. Obregon at bedside. Pt repositioned into stirrups, pt educated on proper pushing technique. RN continuously at bedside evaluating FHT and pushing progress.  183  of viable female infant by Dr. Obregon to maternal abdomen. Apgars 8/9     Placenta delivered intact.    1900 Fundus firm, lochia light   Pt ambulated to bathroom in stable condition at this time. Pt able to void. Ana pads changed.   Pt transferred to  in  stable condition via wheel chair with infant in arms. Report given to Jitendra OLIVERA. Infant bands matched x2 cuddles active.

## 2019-06-30 LAB
ERYTHROCYTE [DISTWIDTH] IN BLOOD BY AUTOMATED COUNT: 45.7 FL (ref 35.9–50)
HCT VFR BLD AUTO: 32.3 % (ref 37–47)
HGB BLD-MCNC: 10.8 G/DL (ref 12–16)
MCH RBC QN AUTO: 31 PG (ref 27–33)
MCHC RBC AUTO-ENTMCNC: 33.4 G/DL (ref 33.6–35)
MCV RBC AUTO: 92.8 FL (ref 81.4–97.8)
PLATELET # BLD AUTO: 128 K/UL (ref 164–446)
PMV BLD AUTO: 11 FL (ref 9–12.9)
RBC # BLD AUTO: 3.48 M/UL (ref 4.2–5.4)
WBC # BLD AUTO: 7.4 K/UL (ref 4.8–10.8)

## 2019-06-30 PROCEDURE — 770002 HCHG ROOM/CARE - OB PRIVATE (112)

## 2019-06-30 PROCEDURE — A9270 NON-COVERED ITEM OR SERVICE: HCPCS | Performed by: SPECIALIST

## 2019-06-30 PROCEDURE — 700102 HCHG RX REV CODE 250 W/ 637 OVERRIDE(OP): Performed by: SPECIALIST

## 2019-06-30 PROCEDURE — 85027 COMPLETE CBC AUTOMATED: CPT

## 2019-06-30 PROCEDURE — 36415 COLL VENOUS BLD VENIPUNCTURE: CPT

## 2019-06-30 RX ADMIN — OXYCODONE HYDROCHLORIDE AND ACETAMINOPHEN 1 TABLET: 5; 325 TABLET ORAL at 10:13

## 2019-06-30 RX ADMIN — OXYCODONE HYDROCHLORIDE AND ACETAMINOPHEN 1 TABLET: 5; 325 TABLET ORAL at 18:19

## 2019-06-30 RX ADMIN — OXYCODONE HYDROCHLORIDE AND ACETAMINOPHEN 1 TABLET: 5; 325 TABLET ORAL at 04:53

## 2019-06-30 RX ADMIN — IBUPROFEN 600 MG: 600 TABLET ORAL at 18:19

## 2019-06-30 RX ADMIN — IBUPROFEN 600 MG: 600 TABLET ORAL at 04:53

## 2019-06-30 NOTE — ANESTHESIA POSTPROCEDURE EVALUATION
Patient: Senia Tarango    Procedure Summary     Date:  06/29/19 Room / Location:      Anesthesia Start:  1555 Anesthesia Stop:  1834    Procedure:  Labor Epidural Diagnosis:      Scheduled Providers:   Responsible Provider:  Feroz Hernandez M.D.    Anesthesia Type:  epidural ASA Status:  2          Final Anesthesia Type: epidural  Last vitals  BP   Blood Pressure: 120/61    Temp   36.3 °C (97.4 °F)    Pulse   Pulse: 63   Resp   18    SpO2   97 %      Anesthesia Post Evaluation    Patient location during evaluation: PACU  Patient participation: complete - patient participated  Level of consciousness: awake and alert  Pain score: 0    Airway patency: patent  Anesthetic complications: no  Cardiovascular status: hemodynamically stable  Respiratory status: acceptable  Hydration status: euvolemic    PONV: none           Nurse Pain Score: 0 (NPRS)

## 2019-06-30 NOTE — ADDENDUM NOTE
Addendum  created 06/29/19 2306 by Feroz Hernandez M.D.    Anesthesia Event edited, Anesthesia Intra Meds edited

## 2019-06-30 NOTE — PROGRESS NOTES
0800 Assessment completed, fundus firm, lochia light. Plan of care reviewed, verbalized understanding. Patient denies pain at this time, will call if pain med intervention needed.

## 2019-06-30 NOTE — PROGRESS NOTES
Pt. Admitted to floor by Jennifer L&D RN. Pt. Assessed at this time. Vital signs WDL. Fundus firm, lochia light. Pt. Educated on infant safety and use of call light add when to call for assistance. Pt. Oriented to room. 0-10 pain scale discussed. Pt. States 2/10 pain at this time. Will continue to monitor.

## 2019-06-30 NOTE — ANESTHESIA QCDR
2019 [facility] Clinical Data Registry (for Quality Improvement)     Postoperative nausea/vomiting risk protocol (Adult = 18 yrs and Pediatric 3-17 yrs)- (430 and 463)  General inhalation anesthetic (NOT TIVA) with PONV risk factors: No  Provision of anti-emetic therapy with at least 2 different classes of agents: N/A  Patient DID NOT receive anti-emetic therapy and reason is documented in Medical Record: N/A    Multimodal Pain Management- (AQI59)  Patient undergoing Elective Surgery (i.e. Outpatient, or ASC, or Prescheduled Surgery prior to Hospital Admission): No  Use of Multimodal Pain Management, two or more drugs and/or interventions, NOT including systemic opioids: N/A  Exception: Documented allergy to multiple classes of analgesics: N/A    PACU assessment of acute postoperative pain prior to Anesthesia Care End- Applies to Patients Age = 18- (ABG7)  Initial PACU pain score is which of the following: < 7/10  Patient unable to report pain score: N/A    Post-anesthetic transfer of care checklist/protocol to PACU/ICU- (426 and 427)  Upon conclusion of case, patient transferred to which of the following locations: PACU/Non-ICU  Use of transfer checklist/protocol: Yes  Exclusion: Service Performed in Patient Hospital Room (and thus did not require transfer): N/A    PACU Reintubation- (AQI31)  General anesthesia requiring endotracheal intubation (ETT) along with subsequent extubation in OR or PACU: No  Required reintubation in the PACU: N/A  Extubation was a planned trial documented in the medical record prior to removal of the original airway device: N/A    Unplanned admission to ICU related to anesthesia service up through end of PACU care- (MD51)  Unplanned admission to ICU (not initially anticipated at anesthesia start time): No

## 2019-06-30 NOTE — L&D DELIVERY NOTE
Normal spontaneous vaginal delivery.  Live term female .  Apgar scores of approximately 8 and 9 at one and five minutes respectively.   weight not yet recorded but estimated to be approximately 3,200 grams.  Baby was delivered over and intact perineum under continuous epidural anesthesia.  The placenta was than simply delivered and examined and found to be complete.  Next examination of the vulva and vaginal mucosa revealed no lacerations.    The uterus appeared to be firm.  The estimated bloodloss was approximately 100 cc.  Luis Obregon M.D.

## 2019-06-30 NOTE — PROGRESS NOTES
Senia is a very pleasant 29-year-old multipara ( 4, para-2) who is today 40 weeks and 2 days gestation.  She is being admitted to [facility] Labor and Delivery for induction of labor for postdates.  When I saw her in the office on 2019 her cervix was found to be very high.  Of note her recent vaginal culture for group B strep came back negative for group B strep.  When she was admitted to labor and delivery this morning cervical exam revealed that the cervix was 2 to 3 centimeters dilated, 60 percent effaced, and -3 station.  The fetal heart tracing was found to be category one.  She was started on Pitocin.  The Pitocin has been gradually increased.  A labor epidural about 4:00 PM.  At about 4:45 PM today I checked her cervix and found her cervix at that time to be about 5 centimeters dilated, 80 percent effaced, and -2 station and I did at that time perform artificial rupture of membranes and clear amniotic fluid was observed and then I placed an intrauterine pressure catheter.  The fetal heart tracing continues to be category one.  We will anticipate an obstetrical delivery later today.  Luis Obregon M.D.

## 2019-06-30 NOTE — L&D DELIVERY NOTE
DATE OF SERVICE:  2019    The patient is a very pleasant 29-year-old multipara (on admission  4,   para 2) who is today 40 weeks and 2 days gestation.  She has had her prenatal   care with myself during the course of this pregnancy.  She was admitted to   [facility] labor and delivery this morning for induction   of labor for postdates.  When I saw her in the office on 2019, her cervix at that time was found to be high and it was difficult   to assess dilation and effacement.  Of note, her recent vaginal culture for   group B Strep came back negative for group B Strep.  She was admitted to labor   and delivery this morning and cervical exam on admission (the nurse's   cervical exam) revealed that the cervix was 2-3 cm dilated and 60% effaced and   -3 station.  The fetal heart tracing was found to be category 1.  She was   started on the Pitocin.  The Pitocin was gradually increased.  She received a   labor epidural at about 4:00 p.m.  At about 4:45 p.m., I checked her cervix   and found her cervix at that time to be about 5 cm dilated, 80% effaced, and   -2 station and I did at that time perform artificial rupture of membranes and   clear amniotic fluid was observed and I then at that time placed intrauterine   pressure catheter.  The fetal heart tracing continued to be category 1.  Her   labor progressed.  Then, at approximately 6:30 p.m. today, 2019, she went on to have a normal spontaneous vaginal delivery, at 40   weeks and 2 days gestation, and she was delivered of a live term female    with Apgar scores of approximately 8 and 9 at one and five minutes   respectively and a  weight, which has not yet been measured or has not   yet been made available to me, but which I estimated to be approximately 3200   g.  Baby was delivered over an intact perineum under continuous epidural   anesthesia.  The placenta was simply delivered and  examined and found to be   complete.  Next, examination of the vulva and vaginal mucosa revealed no   lacerations.  The uterus appeared to be firm.    ESTIMATED BLOOD LOSS:  Approximately 100 mL.    Addendum: the  weight was 3,025 grams       ____________________________________     Luis Obregon MD    MED / NTS    DD:  2019 19:06:45  DT:  2019 22:49:07    D#:  [id number]  Job#:  [id number]    cc: DENISE FIERRO MD

## 2019-06-30 NOTE — CARE PLAN
Problem: Altered physiologic condition related to immediate post-delivery state and potential for bleeding/hemorrhage  Goal: Patient physiologically stable as evidenced by normal lochia, palpable uterine involution and vital signs within normal limits  Outcome: PROGRESSING AS EXPECTED  Fundus firm, lochia light    Problem: Alteration in comfort related to episiotomy, vaginal repair and/or after birth pains  Goal: Patient is able to ambulate, care for self and infant  Outcome: PROGRESSING AS EXPECTED  Patient ambulating, caring for self and infant

## 2019-07-01 VITALS
BODY MASS INDEX: 33.11 KG/M2 | RESPIRATION RATE: 17 BRPM | HEIGHT: 66 IN | OXYGEN SATURATION: 93 % | TEMPERATURE: 98.2 F | HEART RATE: 56 BPM | SYSTOLIC BLOOD PRESSURE: 130 MMHG | WEIGHT: 206 LBS | DIASTOLIC BLOOD PRESSURE: 78 MMHG

## 2019-07-01 PROBLEM — O48.0 POST-DATES PREGNANCY: Status: ACTIVE | Noted: 2019-07-01

## 2019-07-01 PROCEDURE — A9270 NON-COVERED ITEM OR SERVICE: HCPCS | Performed by: SPECIALIST

## 2019-07-01 PROCEDURE — 700102 HCHG RX REV CODE 250 W/ 637 OVERRIDE(OP): Performed by: SPECIALIST

## 2019-07-01 RX ORDER — IBUPROFEN 600 MG/1
800 TABLET ORAL EVERY 8 HOURS PRN
Qty: 30 TAB | Refills: 0 | Status: SHIPPED | OUTPATIENT
Start: 2019-07-01

## 2019-07-01 RX ORDER — OXYCODONE HYDROCHLORIDE AND ACETAMINOPHEN 5; 325 MG/1; MG/1
1 TABLET ORAL EVERY 6 HOURS PRN
Qty: 28 TAB | Refills: 0 | Status: SHIPPED | OUTPATIENT
Start: 2019-07-01 | End: 2019-07-08

## 2019-07-01 RX ADMIN — IBUPROFEN 600 MG: 600 TABLET ORAL at 05:42

## 2019-07-01 RX ADMIN — Medication 1 TABLET: at 05:42

## 2019-07-01 ASSESSMENT — EDINBURGH POSTNATAL DEPRESSION SCALE (EPDS)
THE THOUGHT OF HARMING MYSELF HAS OCCURRED TO ME: NEVER
I HAVE BEEN ABLE TO LAUGH AND SEE THE FUNNY SIDE OF THINGS: AS MUCH AS I ALWAYS COULD
I HAVE FELT SCARED OR PANICKY FOR NO GOOD REASON: NO, NOT MUCH
I HAVE FELT SAD OR MISERABLE: NO, NOT AT ALL
THINGS HAVE BEEN GETTING ON TOP OF ME: NO, MOST OF THE TIME I HAVE COPED QUITE WELL
I HAVE LOOKED FORWARD WITH ENJOYMENT TO THINGS: RATHER LESS THAN I USED TO
I HAVE BLAMED MYSELF UNNECESSARILY WHEN THINGS WENT WRONG: NOT VERY OFTEN
I HAVE BEEN ANXIOUS OR WORRIED FOR NO GOOD REASON: YES, SOMETIMES
I HAVE BEEN SO UNHAPPY THAT I HAVE HAD DIFFICULTY SLEEPING: NOT VERY OFTEN
I HAVE BEEN SO UNHAPPY THAT I HAVE BEEN CRYING: NO, NEVER

## 2019-07-01 NOTE — DISCHARGE INSTRUCTIONS
POSTPARTUM DISCHARGE INSTRUCTIONS FOR MOM    YOB: 1990   Age: 29 y.o.               Admit Date: 6/29/2019     Discharge Date: 7/1/2019  Attending Doctor:  Luis Obregon M.D.                  Allergies:  Banana and Food    Discharged to home by car. Discharged via wheelchair, hospital escort: Yes.  Special equipment needed: Not Applicable  Belongings with: Personal  Be sure to schedule a follow-up appointment with your primary care doctor or any specialists as instructed.     Discharge Plan:   Diet Plan: Discussed  Activity Level: Discussed  Confirmed Follow up Appointment: Patient to Call and Schedule Appointment  Medication Reconciliation Updated: Yes  Influenza Vaccine Indication: Indicated: Not available from distributor/    REASONS TO CALL YOUR OBSTETRICIAN:  1.   Persistent fever or shaking chills (Temperature higher than 100.4)  2.   Heavy bleeding (soaking more than 1 pad per hour); Passing clots  3.   Foul odor from vagina  4.   Mastitis (Breast infection; breast pain, chills, fever, redness)  5.   Urinary pain, burning or frequency  6.   Episiotomy infection  7.   Abdominal incision infection  8.   Severe depression longer than 24 hours    HAND WASHING  · Prior to handling the baby.  · Before breastfeeding or bottle feeding baby.  · After using the bathroom or changing the baby's diaper.      VAGINAL CARE  · Nothing inside vagina for 6 weeks: no sexual intercourse, tampons or douching.  · Bleeding may continue for 2-4 weeks.  Amount may vary.    · Call your physician for heavy bleeding which means soaking more than 1 pad per hour    BIRTH CONTROL  · It is possible to become pregnant at any time after delivery and while breastfeeding.  · Plan to discuss a method of birth control with your physician at your follow up visit. visit.    DIET AND ELIMINATION  · Eating more fiber (bran cereal, fruits, and vegetables) and drinking plenty of fluids will help to avoid  "constipation.  · Urinary frequency after childbirth is normal.    POSTPARTUM BLUES  During the first few days after birth, you may experience a sense of the \"blues\" which may include impatience, irritability or even crying.  These feeling come and go quickly.  However, as many as 1 in 10 women experience emotional symptoms known as postpartum depression.    Postpartum depression:  May start as early as the second or third day after delivery or take several weeks or months to develop.  Symptoms of \"blues\" are present, but are more intense:  Crying spells; loss of appetite; feelings of hopelessness or loss of control; fear of touching the baby; over concern or no concern at all about the baby; little or no concern about your own appearance/caring for yourself; and/or inability to sleep or excessive sleeping.  Contact your physician if you are experiencing any of these symptoms.    Crisis Hotline:  · [place] Crisis Hotline:  3-586-YWULEXO  Or 1-447.365.7642  · [place] Crisis Hotline:  1-293.293.7789  Or 083-045-7584    PREVENTING SHAKEN BABY:  If you are angry or stressed, PUT THE BABY IN THE CRIB, step away, take some deep breaths, and wait until you are calm to care for the baby.  DO NOT SHAKE THE BABY.  You are not alone, call a supporter for help.    · [facility] 24/7 crisis line 507-338-4765 or 1-480.449.5411  · You can also text them, text \"ANSWER\" to 142966      DEPRESSION / SUICIDE RISK:  As you are discharged from this [facility] facility, it is important to learn how to keep safe from harming yourself.    Recognize the warning signs:  · Abrupt changes in personality, positive or negative- including increase in energy   · Giving away possessions  · Change in eating patterns- significant weight changes-  positive or negative  · Change in sleeping patterns- unable to sleep or sleeping all the time   · Unwillingness or inability to communicate  · Depression  · Unusual sadness, discouragement and " loneliness  · Talk of wanting to die  · Neglect of personal appearance   · Rebelliousness- reckless behavior  · Withdrawal from people/activities they love  · Confusion- inability to concentrate     If you or a loved one observes any of these behaviors or has concerns about self-harm, here's what you can do:  · Talk about it- your feelings and reasons for harming yourself  · Remove any means that you might use to hurt yourself (examples: pills, rope, extension cords, firearm)  · Get professional help from the community (Mental Health, Substance Abuse, psychological counseling)  · Do not be alone:Call your Safe Contact- someone whom you trust who will be there for you.  · Call your local CRISIS HOTLINE 456-5791 or 066-477-7829  · Call your local Children's Mobile Crisis Response Team [place] (483) 800-4123 or www.[employer]  · Call the toll free National Suicide Prevention Hotlines   · National Suicide Prevention Lifeline 257-267-PJES (0776)  · National Hope Line Network 800-SUICIDE (519-7306)    DISCHARGE SURVEY:  Thank you for choosing [facility].  We hope we provided you with very good care.  You may be receiving a survey in the mail.  Please fill it out.  Your opinion is valuable to us.    ADDITIONAL EDUCATIONAL MATERIALS GIVEN TO PATIENT:        My signature on this form indicates that:  1.  I have reviewed and understand the above information  2.  My questions regarding this information have been answered to my satisfaction.  3.  I have formulated a plan with my discharge nurse to obtain my prescribed medication for home.

## 2019-07-01 NOTE — LACTATION NOTE
This note was copied from a baby's chart.  Mother reports that she is breastfeeding her  without difficulty or discomfort. Resources for out-patient support discussed.

## 2019-07-01 NOTE — PROGRESS NOTES
POST PARTUM DAY # 2  The patient says that she feels fine and that she has no problems or complaints.   The patient's vital signs are stable and she is afebrile.   She appears well developed and well nourished and relaxed and alert and comfortable and in no apparent distress.   The patient's antepartum hemoglobin was 11.1 grams per deciliter and her post partum hemoglobin was 10.8 grams per deciliter.   Will discharge home today.   Luis Obregon M.D.

## 2019-07-01 NOTE — PROGRESS NOTES
Discharge instruction for mom and baby discussed. Prescription given and explained. Emphasized the importance of  screening follow-up test. Questions and concerns have been answered. ID band matches with MOB.

## 2019-07-01 NOTE — PROGRESS NOTES
Assessment done vital signs stable. Patient progressing according to plan of care. Fundus firm at the umbilicus with light lochia. Patient up voiding without difficulty. Ambulating with steady gait. Claims to have good pain relief with p.o medications. Breast feeding infant on demand. Family at bedside. POC discussed. Pt claims she will call foe medications or any needs

## 2019-08-20 RX ORDER — SODIUM CHLORIDE, SODIUM LACTATE, POTASSIUM CHLORIDE, CALCIUM CHLORIDE 600; 310; 30; 20 MG/100ML; MG/100ML; MG/100ML; MG/100ML
INJECTION, SOLUTION INTRAVENOUS CONTINUOUS
Status: CANCELLED | OUTPATIENT
Start: 2019-08-20 | End: 2019-08-20

## 2019-08-24 ENCOUNTER — ANESTHESIA (OUTPATIENT)
Dept: SURGERY | Facility: MEDICAL CENTER | Age: 29
End: 2019-08-24
Payer: MEDICAID

## 2019-08-24 ENCOUNTER — ANESTHESIA EVENT (OUTPATIENT)
Dept: SURGERY | Facility: MEDICAL CENTER | Age: 29
End: 2019-08-24
Payer: MEDICAID

## 2019-08-24 ENCOUNTER — HOSPITAL ENCOUNTER (OUTPATIENT)
Facility: MEDICAL CENTER | Age: 29
End: 2019-08-24
Attending: SPECIALIST | Admitting: SPECIALIST
Payer: MEDICAID

## 2019-08-24 VITALS
HEIGHT: 66 IN | RESPIRATION RATE: 14 BRPM | BODY MASS INDEX: 28.91 KG/M2 | OXYGEN SATURATION: 98 % | HEART RATE: 48 BPM | SYSTOLIC BLOOD PRESSURE: 129 MMHG | DIASTOLIC BLOOD PRESSURE: 75 MMHG | TEMPERATURE: 97.1 F | WEIGHT: 179.9 LBS

## 2019-08-24 DIAGNOSIS — G89.18 POST-OP PAIN: ICD-10-CM

## 2019-08-24 PROBLEM — Z30.2 ENCOUNTER FOR STERILIZATION: Status: ACTIVE | Noted: 2019-08-24

## 2019-08-24 LAB
HCG UR QL: NEGATIVE
SP GR UR REFRACTOMETRY: 1.02

## 2019-08-24 PROCEDURE — 81025 URINE PREGNANCY TEST: CPT

## 2019-08-24 PROCEDURE — 160025 RECOVERY II MINUTES (STATS): Performed by: SPECIALIST

## 2019-08-24 PROCEDURE — 700105 HCHG RX REV CODE 258: Performed by: ANESTHESIOLOGY

## 2019-08-24 PROCEDURE — 500854 HCHG NEEDLE, INSUFFLATION FOR STEP: Performed by: SPECIALIST

## 2019-08-24 PROCEDURE — 501399 HCHG SPECIMAN BAG, ENDO CATC: Performed by: SPECIALIST

## 2019-08-24 PROCEDURE — 88302 TISSUE EXAM BY PATHOLOGIST: CPT

## 2019-08-24 PROCEDURE — 160036 HCHG PACU - EA ADDL 30 MINS PHASE I: Performed by: SPECIALIST

## 2019-08-24 PROCEDURE — 160046 HCHG PACU - 1ST 60 MINS PHASE II: Performed by: SPECIALIST

## 2019-08-24 PROCEDURE — 501579 HCHG TROCAR, STEP 5MM: Performed by: SPECIALIST

## 2019-08-24 PROCEDURE — 700101 HCHG RX REV CODE 250: Performed by: ANESTHESIOLOGY

## 2019-08-24 PROCEDURE — 500577 HCHG FORCEP, BIPO CUT (EVEREST): Performed by: SPECIALIST

## 2019-08-24 PROCEDURE — 160009 HCHG ANES TIME/MIN: Performed by: SPECIALIST

## 2019-08-24 PROCEDURE — 700111 HCHG RX REV CODE 636 W/ 250 OVERRIDE (IP)

## 2019-08-24 PROCEDURE — 160039 HCHG SURGERY MINUTES - EA ADDL 1 MIN LEVEL 3: Performed by: SPECIALIST

## 2019-08-24 PROCEDURE — 160035 HCHG PACU - 1ST 60 MINS PHASE I: Performed by: SPECIALIST

## 2019-08-24 PROCEDURE — 501838 HCHG SUTURE GENERAL: Performed by: SPECIALIST

## 2019-08-24 PROCEDURE — 160002 HCHG RECOVERY MINUTES (STAT): Performed by: SPECIALIST

## 2019-08-24 PROCEDURE — A9270 NON-COVERED ITEM OR SERVICE: HCPCS | Performed by: ANESTHESIOLOGY

## 2019-08-24 PROCEDURE — 700105 HCHG RX REV CODE 258: Performed by: SPECIALIST

## 2019-08-24 PROCEDURE — 700102 HCHG RX REV CODE 250 W/ 637 OVERRIDE(OP): Performed by: ANESTHESIOLOGY

## 2019-08-24 PROCEDURE — 500886 HCHG PACK, LAPAROSCOPY: Performed by: SPECIALIST

## 2019-08-24 PROCEDURE — 160048 HCHG OR STATISTICAL LEVEL 1-5: Performed by: SPECIALIST

## 2019-08-24 PROCEDURE — 700111 HCHG RX REV CODE 636 W/ 250 OVERRIDE (IP): Performed by: ANESTHESIOLOGY

## 2019-08-24 PROCEDURE — 160028 HCHG SURGERY MINUTES - 1ST 30 MINS LEVEL 3: Performed by: SPECIALIST

## 2019-08-24 RX ORDER — SODIUM CHLORIDE, SODIUM LACTATE, POTASSIUM CHLORIDE, CALCIUM CHLORIDE 600; 310; 30; 20 MG/100ML; MG/100ML; MG/100ML; MG/100ML
INJECTION, SOLUTION INTRAVENOUS CONTINUOUS
Status: DISCONTINUED | OUTPATIENT
Start: 2019-08-24 | End: 2019-08-24 | Stop reason: HOSPADM

## 2019-08-24 RX ORDER — ACETAMINOPHEN 500 MG
1000 TABLET ORAL ONCE
Status: COMPLETED | OUTPATIENT
Start: 2019-08-24 | End: 2019-08-24

## 2019-08-24 RX ORDER — LIDOCAINE HYDROCHLORIDE 10 MG/ML
INJECTION, SOLUTION EPIDURAL; INFILTRATION; INTRACAUDAL; PERINEURAL
Status: COMPLETED
Start: 2019-08-24 | End: 2019-08-24

## 2019-08-24 RX ORDER — OXYCODONE HCL 5 MG/5 ML
10 SOLUTION, ORAL ORAL
Status: COMPLETED | OUTPATIENT
Start: 2019-08-24 | End: 2019-08-24

## 2019-08-24 RX ORDER — CEFAZOLIN SODIUM 1 G/3ML
INJECTION, POWDER, FOR SOLUTION INTRAMUSCULAR; INTRAVENOUS PRN
Status: DISCONTINUED | OUTPATIENT
Start: 2019-08-24 | End: 2019-08-24 | Stop reason: SURG

## 2019-08-24 RX ORDER — HYDROMORPHONE HYDROCHLORIDE 1 MG/ML
0.1 INJECTION, SOLUTION INTRAMUSCULAR; INTRAVENOUS; SUBCUTANEOUS
Status: DISCONTINUED | OUTPATIENT
Start: 2019-08-24 | End: 2019-08-24 | Stop reason: HOSPADM

## 2019-08-24 RX ORDER — LIDOCAINE HYDROCHLORIDE 20 MG/ML
INJECTION, SOLUTION EPIDURAL; INFILTRATION; INTRACAUDAL; PERINEURAL PRN
Status: DISCONTINUED | OUTPATIENT
Start: 2019-08-24 | End: 2019-08-24 | Stop reason: SURG

## 2019-08-24 RX ORDER — MEPERIDINE HYDROCHLORIDE 25 MG/ML
INJECTION INTRAMUSCULAR; INTRAVENOUS; SUBCUTANEOUS
Status: COMPLETED
Start: 2019-08-24 | End: 2019-08-24

## 2019-08-24 RX ORDER — ONDANSETRON 2 MG/ML
INJECTION INTRAMUSCULAR; INTRAVENOUS PRN
Status: DISCONTINUED | OUTPATIENT
Start: 2019-08-24 | End: 2019-08-24 | Stop reason: SURG

## 2019-08-24 RX ORDER — ONDANSETRON 2 MG/ML
4 INJECTION INTRAMUSCULAR; INTRAVENOUS
Status: COMPLETED | OUTPATIENT
Start: 2019-08-24 | End: 2019-08-24

## 2019-08-24 RX ORDER — HALOPERIDOL 5 MG/ML
1 INJECTION INTRAMUSCULAR
Status: DISCONTINUED | OUTPATIENT
Start: 2019-08-24 | End: 2019-08-24 | Stop reason: HOSPADM

## 2019-08-24 RX ORDER — OXYCODONE HYDROCHLORIDE AND ACETAMINOPHEN 5; 325 MG/1; MG/1
1 TABLET ORAL EVERY 6 HOURS PRN
Qty: 28 TAB | Refills: 0 | Status: SHIPPED | OUTPATIENT
Start: 2019-08-24 | End: 2019-08-31

## 2019-08-24 RX ORDER — GABAPENTIN 300 MG/1
300 CAPSULE ORAL ONCE
Status: COMPLETED | OUTPATIENT
Start: 2019-08-24 | End: 2019-08-24

## 2019-08-24 RX ORDER — DIPHENHYDRAMINE HYDROCHLORIDE 50 MG/ML
12.5 INJECTION INTRAMUSCULAR; INTRAVENOUS
Status: DISCONTINUED | OUTPATIENT
Start: 2019-08-24 | End: 2019-08-24 | Stop reason: HOSPADM

## 2019-08-24 RX ORDER — OXYCODONE HCL 5 MG/5 ML
5 SOLUTION, ORAL ORAL
Status: COMPLETED | OUTPATIENT
Start: 2019-08-24 | End: 2019-08-24

## 2019-08-24 RX ORDER — DEXAMETHASONE SODIUM PHOSPHATE 4 MG/ML
INJECTION, SOLUTION INTRA-ARTICULAR; INTRALESIONAL; INTRAMUSCULAR; INTRAVENOUS; SOFT TISSUE PRN
Status: DISCONTINUED | OUTPATIENT
Start: 2019-08-24 | End: 2019-08-24 | Stop reason: SURG

## 2019-08-24 RX ORDER — CELECOXIB 200 MG/1
400 CAPSULE ORAL ONCE
Status: COMPLETED | OUTPATIENT
Start: 2019-08-24 | End: 2019-08-24

## 2019-08-24 RX ORDER — IBUPROFEN 800 MG/1
800 TABLET ORAL EVERY 8 HOURS PRN
Qty: 30 TAB | Refills: 0 | Status: SHIPPED | OUTPATIENT
Start: 2019-08-24

## 2019-08-24 RX ORDER — HYDROMORPHONE HYDROCHLORIDE 1 MG/ML
0.2 INJECTION, SOLUTION INTRAMUSCULAR; INTRAVENOUS; SUBCUTANEOUS
Status: DISCONTINUED | OUTPATIENT
Start: 2019-08-24 | End: 2019-08-24 | Stop reason: HOSPADM

## 2019-08-24 RX ORDER — SODIUM CHLORIDE, SODIUM LACTATE, POTASSIUM CHLORIDE, CALCIUM CHLORIDE 600; 310; 30; 20 MG/100ML; MG/100ML; MG/100ML; MG/100ML
INJECTION, SOLUTION INTRAVENOUS
Status: DISCONTINUED | OUTPATIENT
Start: 2019-08-24 | End: 2019-08-24 | Stop reason: SURG

## 2019-08-24 RX ORDER — ROCURONIUM BROMIDE 10 MG/ML
INJECTION, SOLUTION INTRAVENOUS PRN
Status: DISCONTINUED | OUTPATIENT
Start: 2019-08-24 | End: 2019-08-24 | Stop reason: SURG

## 2019-08-24 RX ORDER — MEPERIDINE HYDROCHLORIDE 25 MG/ML
6.25 INJECTION INTRAMUSCULAR; INTRAVENOUS; SUBCUTANEOUS
Status: DISCONTINUED | OUTPATIENT
Start: 2019-08-24 | End: 2019-08-24 | Stop reason: HOSPADM

## 2019-08-24 RX ORDER — SIMETHICONE 80 MG
80 TABLET,CHEWABLE ORAL EVERY 8 HOURS PRN
Status: DISCONTINUED | OUTPATIENT
Start: 2019-08-24 | End: 2019-08-24 | Stop reason: HOSPADM

## 2019-08-24 RX ORDER — HYDROMORPHONE HYDROCHLORIDE 1 MG/ML
0.4 INJECTION, SOLUTION INTRAMUSCULAR; INTRAVENOUS; SUBCUTANEOUS
Status: DISCONTINUED | OUTPATIENT
Start: 2019-08-24 | End: 2019-08-24 | Stop reason: HOSPADM

## 2019-08-24 RX ADMIN — Medication 0.5 ML: at 10:25

## 2019-08-24 RX ADMIN — DEXAMETHASONE SODIUM PHOSPHATE 8 MG: 4 INJECTION, SOLUTION INTRA-ARTICULAR; INTRALESIONAL; INTRAMUSCULAR; INTRAVENOUS; SOFT TISSUE at 11:41

## 2019-08-24 RX ADMIN — ACETAMINOPHEN 1000 MG: 500 TABLET ORAL at 11:20

## 2019-08-24 RX ADMIN — SODIUM CHLORIDE, POTASSIUM CHLORIDE, SODIUM LACTATE AND CALCIUM CHLORIDE: 600; 310; 30; 20 INJECTION, SOLUTION INTRAVENOUS at 13:27

## 2019-08-24 RX ADMIN — OXYCODONE HYDROCHLORIDE 10 MG: 5 SOLUTION ORAL at 13:25

## 2019-08-24 RX ADMIN — GABAPENTIN 300 MG: 300 CAPSULE ORAL at 11:20

## 2019-08-24 RX ADMIN — FENTANYL CITRATE 100 MCG: 50 INJECTION, SOLUTION INTRAMUSCULAR; INTRAVENOUS at 11:35

## 2019-08-24 RX ADMIN — MEPERIDINE HYDROCHLORIDE 6.25 MG: 25 INJECTION INTRAMUSCULAR; INTRAVENOUS; SUBCUTANEOUS at 12:59

## 2019-08-24 RX ADMIN — LIDOCAINE HYDROCHLORIDE 5 ML: 20 INJECTION, SOLUTION EPIDURAL; INFILTRATION; INTRACAUDAL at 11:35

## 2019-08-24 RX ADMIN — ROCURONIUM BROMIDE 50 MG: 10 INJECTION, SOLUTION INTRAVENOUS at 11:35

## 2019-08-24 RX ADMIN — LIDOCAINE HYDROCHLORIDE 0.5 ML: 10 INJECTION, SOLUTION EPIDURAL; INFILTRATION; INTRACAUDAL at 10:25

## 2019-08-24 RX ADMIN — SODIUM CHLORIDE, POTASSIUM CHLORIDE, SODIUM LACTATE AND CALCIUM CHLORIDE: 600; 310; 30; 20 INJECTION, SOLUTION INTRAVENOUS at 11:30

## 2019-08-24 RX ADMIN — SUGAMMADEX 200 MG: 100 INJECTION, SOLUTION INTRAVENOUS at 12:33

## 2019-08-24 RX ADMIN — MEPERIDINE HYDROCHLORIDE 6.25 MG: 25 INJECTION INTRAMUSCULAR; INTRAVENOUS; SUBCUTANEOUS at 12:54

## 2019-08-24 RX ADMIN — ROCURONIUM BROMIDE 10 MG: 10 INJECTION, SOLUTION INTRAVENOUS at 12:12

## 2019-08-24 RX ADMIN — CEFAZOLIN 2 G: 330 INJECTION, POWDER, FOR SOLUTION INTRAMUSCULAR; INTRAVENOUS at 11:59

## 2019-08-24 RX ADMIN — PROPOFOL 180 MG: 10 INJECTION, EMULSION INTRAVENOUS at 11:35

## 2019-08-24 RX ADMIN — FENTANYL CITRATE 50 MCG: 0.05 INJECTION, SOLUTION INTRAMUSCULAR; INTRAVENOUS at 13:23

## 2019-08-24 RX ADMIN — CELECOXIB 400 MG: 200 CAPSULE ORAL at 11:20

## 2019-08-24 RX ADMIN — SODIUM CHLORIDE, POTASSIUM CHLORIDE, SODIUM LACTATE AND CALCIUM CHLORIDE: 600; 310; 30; 20 INJECTION, SOLUTION INTRAVENOUS at 10:25

## 2019-08-24 RX ADMIN — ONDANSETRON 4 MG: 2 INJECTION INTRAMUSCULAR; INTRAVENOUS at 12:03

## 2019-08-24 RX ADMIN — ROCURONIUM BROMIDE 10 MG: 10 INJECTION, SOLUTION INTRAVENOUS at 12:02

## 2019-08-24 RX ADMIN — ONDANSETRON 4 MG: 2 INJECTION INTRAMUSCULAR; INTRAVENOUS at 13:36

## 2019-08-24 ASSESSMENT — PAIN SCALES - GENERAL: PAIN_LEVEL: 4

## 2019-08-24 NOTE — OR NURSING
1255-Demerol IV given for post-op shivering. Shivering subsided after minutes of demerol administration    Pt states they are nauseated. Medicated with Zofran. Nausea subsided.

## 2019-08-24 NOTE — ANESTHESIA QCDR
2019 [facility] Clinical Data Registry (for Quality Improvement)     Postoperative nausea/vomiting risk protocol (Adult = 18 yrs and Pediatric 3-17 yrs)- (430 and 463)  General inhalation anesthetic (NOT TIVA) with PONV risk factors: No  Provision of anti-emetic therapy with at least 2 different classes of agents: N/A  Patient DID NOT receive anti-emetic therapy and reason is documented in Medical Record: N/A    Multimodal Pain Management- (AQI59)  Patient undergoing Elective Surgery (i.e. Outpatient, or ASC, or Prescheduled Surgery prior to Hospital Admission): Yes  Use of Multimodal Pain Management, two or more drugs and/or interventions, NOT including systemic opioids: Yes   Exception: Documented allergy to multiple classes of analgesics:  N/A    PACU assessment of acute postoperative pain prior to Anesthesia Care End- Applies to Patients Age = 18- (ABG7)  Initial PACU pain score is which of the following: < 7/10  Patient unable to report pain score: N/A    Post-anesthetic transfer of care checklist/protocol to PACU/ICU- (426 and 427)  Upon conclusion of case, patient transferred to which of the following locations: PACU/Non-ICU  Use of transfer checklist/protocol: Yes  Exclusion: Service Performed in Patient Hospital Room (and thus did not require transfer): N/A    PACU Reintubation- (AQI31)  General anesthesia requiring endotracheal intubation (ETT) along with subsequent extubation in OR or PACU: Yes  Required reintubation in the PACU: No   Extubation was a planned trial documented in the medical record prior to removal of the original airway device:  N/A    Unplanned admission to ICU related to anesthesia service up through end of PACU care- (MD51)  Unplanned admission to ICU (not initially anticipated at anesthesia start time): No

## 2019-08-24 NOTE — OR SURGEON
Immediate Post OP Note    PreOp Diagnosis:   Multiparity and the patient desires permanent tubal sterilization in the form of a laparoscopic bilateral salpingectomy.     PostOp Diagnosis:   Multiparity and the patient desires permanent tubal sterilization in the form of a laparoscopic bilateral salpingectomy.     Procedure(s):  SALPINGECTOMY - Wound Class: Clean Contaminated    Surgeon(s):  Luis Obregon M.D.    Anesthesiologist/Type of Anesthesia:  Anesthesiologist: Jayesh Alicea M.D./General    Surgical Staff:  Circulator: Shana Narayan R.N.  Scrub Person: Jessica Woody R.N.    Specimens removed if any:  ID Type Source Tests Collected by Time Destination   A : BILATERAL FALLOPIAN TUBES Tissue Fallopian Tube PATHOLOGY SPECIMEN Luis Obregon M.D. 8/24/2019 12:31 PM        Estimated Blood Loss:   Less than 20 cc's.     Findings:   There are some adhesions involving the right adnexa and these are easily lysed.  Otherwise serosal surfaces of the uterus are normal and both fallopian tubes are normal in both ovaries are normal in the cul-de-sac is normal in both ovarian fossa are normal and the liver edge appears normal as well.    Complications:   None        8/24/2019 12:42 PM Luis Obregon M.D.

## 2019-08-24 NOTE — OR NURSING
1358- pt to phase II. Pain 4/10, denies nausea. Tolerating oral intake. Pt aware she needs to void before d/c. LR running through PIV.  to bedside. VSS, on RA. abd dressings c/d/i.  1440- ambulated pt to bathroom to void.  1445- pt d/c stable via w/c into care of  without complications.

## 2019-08-24 NOTE — OP REPORT
DATE OF SERVICE:  08/24/2019    PREOPERATIVE DIAGNOSES:  Multiparity and the patient desires permanent tubal   sterilization in the form of laparoscopic bilateral salpingectomy.    POSTOPERATIVE DIAGNOSES:  Multiparity and the patient desires permanent tubal   sterilization in the form of laparoscopic bilateral salpingectomy.    PROCEDURE:  Laparoscopic bilateral tubal sterilization procedure, namely   laparoscopic bilateral salpingectomy.    SURGEON:  Sabas Smith MD    ANESTHESIA:  General endotracheal tube anesthesia.    ANESTHESIOLOGIST:  Jayesh Alicea MD    FINDINGS:  There are some adhesions involving the right adnexa and these   adhesions are easily lysed with the Harmonic scalpel.  Otherwise, both ovaries   are normal and both fallopian tubes are normal and serosal surfaces of the   uterus are normal and the cul-de-sac is normal and both ovarian fossae are   normal.    SPECIMENS:  Both fallopian tubes.    COMPLICATIONS:  None.    ESTIMATED BLOOD LOSS:  Less than 20 mL    DESCRIPTION OF PROCEDURE:  After the appropriate consents have been obtained,   the patient was taken to the operating room and given general anesthesia.  She   is prepped and draped in the dorsal lithotomy position.  The bladder is   emptied with a catheter.  Attention was directed to the abdomen where a small   (approximately 1-1.5 cm) horizontal infraumbilical incision was made with a   scalpel.  A Veress needle was advanced through this incision into the   peritoneal cavity and proper placement in the peritoneal cavity was verified   with palmar hanging drop technique.  The peritoneal cavity was then   insufflated with approximately 2-3 liters of carbon dioxide gas.  The Veress   needle was removed and a 10-12 mm port was introduced through the   infraumbilical incision into the peritoneal cavity utilizing the VersaStep   trocar system.  The central portion of this port was removed and the 10 mm   operative laparoscope was  inserted through the remaining sleeve and proper   entry in the peritoneal cavity was verified visually with laparoscope.  The   patient was placed in Trendelenburg position.  A 5 mm suprapubic port was   placed using the VersaStep trocar system.  Prestige grasper was placed through   the suprapubic port.  The long Harmonic scalpel instrument was placed through   the operative channel of the operative laparoscope.  The right fallopian tube   was clearly identified and followed to its distal fimbriated end (the right   fallopian tube was grasped with a Prestige instrument and placed through the   suprapubic port).  After the distal fimbriated end of the right fallopian tube   was clearly identified, a right salpingectomy was performed in the usual   fashion using the Harmonic scalpel by serially incising and dividing the   mesosalpinx from distal to proximal in the usual fashion and then the right   fallopian tube was transected around its proximal portion.  The right   fallopian tube was grasped with the Harmonic scalpel instrument and removed   through the infraumbilical port along with laparoscope and submitted as a   specimen.  The laparoscope was replaced through the infraumbilical port.    Hemostasis appears adequate.  Attention was directed to the left fallopian   tube.  The left fallopian tube was grasped with a Prestige instrument.  The   left fallopian tube was clearly identified and followed through its distal   fimbriated end.  After the distal fimbriated end of the left fallopian tube   was clearly identified, a left salpingectomy was performed in the usual   fashion by serially sealing and dividing the left mesosalpinx from distal to   proximal in the usual fashion.  The left fallopian tube was then transected at   its proximal portion and then removed along with the laparoscope through the   infraumbilical port and submitted as a specimen.  The laparoscope was   reinserted through the infraumbilical  port.  The Harmonic scalpel was removed   and the gyrus bipolar cutting forceps were then placed through the operative   channel of the operative laparoscope and used to thoroughly cauterize the   proximal portions of both fallopian tube remnant.  The suction irrigation   instrument was placed through the suprapubic port and used to thoroughly   irrigate and drain the pelvis and hemostasis was found to be excellent.    Pictures were taken.  The suction irrigation instrument was used to evacuate   the pneumoperitoneum and the suction irrigation instrument was removed and the   laparoscope removed.  Ports were removed.  The fascia underneath the   infraumbilical incision was identified with the use of S retractors and   reapproximated with placement of simple interrupted suture using Vicryl.  Skin   incisions were reapproximated with placement of multiple interrupted buried   sutures of 4-0 Monocryl placed in the dermis.  Three such sutures were placed   in the infraumbilical skin incision and 2 such sutures were placed in the   suprapubic incision.  The procedure was terminated with final lap and needle   counts reported to be correct x2 at the end of the procedure.  The patient   tolerated the procedure well and sent to postanesthesia recovery in stable   condition.       ____________________________________     Luis Obregon MD    MED / NTS    DD:  08/24/2019 13:17:25  DT:  08/24/2019 15:11:59    D#:  [id number]  Job#:  [id number]    cc: VIVI CURRY MD

## 2019-08-24 NOTE — OR NURSING
Pre-op complete. POC reviewed with pt. hCG negative. No multimodals ordered at this time. Call light within reach, educated to call for assistance if needed.

## 2019-08-24 NOTE — ANESTHESIA TIME REPORT
Anesthesia Start and Stop Event Times     Date Time Event    8/24/2019 1047 Ready for Procedure     1130 Anesthesia Start     1246 Anesthesia Stop        Responsible Staff  08/24/19    Name Role Begin End    Jayesh Alicea M.D. Anesth 1130 1246        Preop Diagnosis (Free Text):  Pre-op Diagnosis     PERMANENT STERILIZATION        Preop Diagnosis (Codes):    Post op Diagnosis  Sterilization      Premium Reason  E. Weekend    Comments:

## 2019-08-24 NOTE — H&P
Senia Tarango          YOB: 1990  Date of today's procedure: Saturday, August 24, 2019  Facility: [facility]    ID: The patient is a very pleasant 29-year-old multipara (para-3, with 3 previous vaginal deliveries.    Chief Complaint: The patient is multiparous and desires permanent tubal sterilization.    History of Present Illness: The patient is scheduled to have a laparoscopic bilateral tubal sterilization procedure today, namely laparoscopic bilateral salpingectomy.  I have discussed with her and explained to her in detail and at length what laparoscopic bilateral salpingectomy is and what laparoscopic bilateral salpingectomy involves, and I have discussed with her and explained to her in detail and at length the risks and benefits and alternatives of laparoscopic bilateral salpingectomy.  After our discussions today and after answering her questions she tells me that she very much wishes for us to proceed with laparoscopic bilateral salpingectomy.    Past Medical History: The patient says that she has no medical illnesses.    Past Surgical History: The patient says that she has had surgery involving her hand.    Medications: The patient says that she currently takes no medications.    Allergies: The patient says that she is allergic to Depo-Provera.    Social History: The patient denies smoking.  She denies consuming alcoholic beverages.  She denies the use of recreational drugs.    Review of Systems  General: The patient denies any fevers, chills, sweats.  Pulmonary: The patient denies any coughing, wheezing, chest pain, shortness of breath.  Cardiovascular: The patient denies any palpitations, dyspnea, chest pain.  Gastrointestinal: The patient denies any nausea, vomiting, diarrhea, constipation, hematochezia, melena, history of hepatitis, history of jaundice.  Genitourinary: The patient denies any menorrhagia or dysmenorrhea or dysuria or  hematuria.  Musculoskeletal: The patient denies any arthralgias or myalgias.   Neurological: No headaches or syncope or seizures.     Physical Exam:   Vital Signs: The patient's vital signs are stable and she is afebrile.  General: The patient appears well developed and well nourished and relaxed and alert and comfortable and in no apparent distress.    HEENT :  Normo-cephalic, atraumatic, pupils equal, round, reactive to light and accommodation, extra ocular motions intact, pharynx clear; there is no thyromegaly. There is no cervical lymphadenopathy.  Chest: Heart regular rate and rhythm, with no murmurs or rubs or gallops; the lungs are clear to auscultation bilaterally.  Abdomen: The abdomen is soft and flat and non-tender and non-distended. There is no hepatomegaly. There is no splenomegaly.   Pelvic: Bimanual exam reveals no evidence of uterine enlargement and no evidence of cervical motion tenderness and no evidence of any tenderness to palpation of the uterine corpus and no evidence of any adnexal masses or tenderness either on the right or the left.  Extremities: No clubbing or cyanosis or edema.   Neurological: non-focal.     Assessment:   Multiparity, the patient desires permanent tubal sterilization.    Plan:   We will proceed today with labs comic bilateral tubal sterilization procedure, namely laparoscopic bilateral salpingectomy.  Please see above.            ________________________  Luis Obregon M.D.

## 2019-08-24 NOTE — ANESTHESIA PROCEDURE NOTES
Airway  Date/Time: 8/24/2019 11:36 AM  Performed by: Jayesh Alicea M.D.  Authorized by: Jayesh Alicea M.D.     Location:  OR  Urgency:  Elective  Indications for Airway Management:  Anesthesia  Spontaneous Ventilation: absent    Sedation Level:  Deep  Preoxygenated: Yes    Patient Position:  Sniffing  Final Airway Type:  Endotracheal airway  Final Endotracheal Airway:  ETT  Cuffed: Yes    Technique Used for Successful ETT Placement:  Direct laryngoscopy  Insertion Site:  Oral  Blade Type:  Walter  Laryngoscope Blade/Videolaryngoscope Blade Size:  3  ETT Size (mm):  7.0  Measured from:  Teeth  ETT to Teeth (cm):  20  Placement Verified by: auscultation and capnometry    Cormack-Lehane Classification:  Grade I - full view of glottis  Number of Attempts at Approach:  1

## 2019-08-24 NOTE — DISCHARGE INSTRUCTIONS
ACTIVITY: Rest and take it easy for the first 24 hours.  A responsible adult is recommended to remain with you during that time.  It is normal to feel sleepy.  We encourage you to not do anything that requires balance, judgment or coordination.    MILD FLU-LIKE SYMPTOMS ARE NORMAL. YOU MAY EXPERIENCE GENERALIZED MUSCLE ACHES, THROAT IRRITATION, HEADACHE AND/OR SOME NAUSEA.    FOR 24 HOURS DO NOT:  Drive, operate machinery or run household appliances.  Drink beer or alcoholic beverages.   Make important decisions or sign legal documents.    SPECIAL INSTRUCTIONS:     Discharge Instruction for Laparoscopic Fallopian Tube Ligation  Your doctor did a sterilization procedure called laparoscopic fallopian tube ligation to prevent any future pregnancies. This is a permanent form of birth control. Several different methods of surgical sterilization can be used to block the fallopian tubes. They all stop the egg from entering the womb (uterus) and sperm from traveling up to fertilize the egg. After a laparoscopic procedure, the incisions on your abdomen may be sore. You may also have pain in your upper back or shoulders. This is from the gas used to enlarge the abdomen to let your doctor to see inside your pelvis and do the procedure. This pain usually goes away in a day or two.  Here's what you can do to speed your recovery after surgery.   Home care  · Take it easy. Stay quiet and rest for 2 days.  · Return to your normal activities after 48 hours. You may also return to work at that time.  · Eat a normal diet.  · If needed, take an over-the-counter pain reliever for pain.  · Don't lift anything heavier than 10 pounds for 1 week after the procedure.  · Don't drive for at least 24 hours after the procedure and until pain is minimal without taking opioids if prescribed  · Don't have sex for 2 weeks after surgery.  Follow-up care  Make a follow-up appointment as directed by our staff.     When to call your healthcare  provider  Call your healthcare provider right away if you have any of the following:  · Fever above 100.4°F (38°C) or higher, or as directed by your healthcare provider  · Chills  · Dizziness or fainting  · Abdominal pain and swelling that get worse  · Nausea and vomiting  · Signs of infection. These include drainage, pus, warmth, or redness at your incision site.  · Sudden chest pain or shortness of breath  · Inability to empty your bladder       DIET: To avoid nausea, slowly advance diet as tolerated, avoiding spicy or greasy foods for the first day.  Add more substantial food to your diet according to your physician's instructions.  Babies can be fed formula or breast milk as soon as they are hungry.  INCREASE FLUIDS AND FIBER TO AVOID CONSTIPATION.    SURGICAL DRESSING/BATHING: Keep dressings clean and dry for 48 hours.     FOLLOW-UP APPOINTMENT:  A follow-up appointment should be arranged with your doctor in 1-2 weeks; call to schedule.    You should CALL YOUR PHYSICIAN if you develop:  Fever greater than 101 degrees F.  Pain not relieved by medication, or persistent nausea or vomiting.  Excessive bleeding (blood soaking through dressing) or unexpected drainage from the wound.  Extreme redness or swelling around the incision site, drainage of pus or foul smelling drainage.  Inability to urinate or empty your bladder within 8 hours.  Problems with breathing or chest pain.    You should call 911 if you develop problems with breathing or chest pain.  If you are unable to contact your doctor or surgical center, you should go to the nearest emergency room or urgent care center.  Physician's telephone #: [phone] Dr. Obregon    If any questions arise, call your doctor.  If your doctor is not available, please feel free to call the Surgical Center at [phone].  The Center is open Monday through Friday from 7AM to 7PM.  You can also call the HEALTH HOTLINE open 24 hours/day, 7 days/week and speak to a nurse at  [phone], or toll free at [phone].    A registered nurse may call you a few days after your surgery to see how you are doing after your procedure.    MEDICATIONS: Resume taking daily medication.  Take prescribed pain medication with food.  If no medication is prescribed, you may take non-aspirin pain medication if needed.  PAIN MEDICATION CAN BE VERY CONSTIPATING.  Take a stool softener or laxative such as senokot, pericolace, or milk of magnesia if needed.    Prescription given for Percocet (narcotic for pain), Motrin (non-narcotic for pain).  Last pain medication given at 1:30 pm.    If your physician has prescribed pain medication that includes Acetaminophen (Tylenol), do not take additional Acetaminophen (Tylenol) while taking the prescribed medication.    Depression / Suicide Risk    As you are discharged from this [facility] facility, it is important to learn how to keep safe from harming yourself.    Recognize the warning signs:  · Abrupt changes in personality, positive or negative- including increase in energy   · Giving away possessions  · Change in eating patterns- significant weight changes-  positive or negative  · Change in sleeping patterns- unable to sleep or sleeping all the time   · Unwillingness or inability to communicate  · Depression  · Unusual sadness, discouragement and loneliness  · Talk of wanting to die  · Neglect of personal appearance   · Rebelliousness- reckless behavior  · Withdrawal from people/activities they love  · Confusion- inability to concentrate     If you or a loved one observes any of these behaviors or has concerns about self-harm, here's what you can do:  · Talk about it- your feelings and reasons for harming yourself  · Remove any means that you might use to hurt yourself (examples: pills, rope, extension cords, firearm)  · Get professional help from the community (Mental Health, Substance Abuse, psychological counseling)  · Do not be alone:Call your Safe  Contact- someone whom you trust who will be there for you.  · Call your local CRISIS HOTLINE 890-5709 or 651-490-2300  · Call your local Children's Mobile Crisis Response Team [place] (883) 943-9086 or www.[facility]lee  · Call the toll free National Suicide Prevention Hotlines   · National Suicide Prevention Lifeline 793-169-AHSM (8866)  · National [employer] Line Network 800-SUICIDE (784-6758)

## 2019-08-24 NOTE — ANESTHESIA POSTPROCEDURE EVALUATION
Patient: Senia Tarango    Procedure Summary     Date:  08/24/19 Room / Location:  [facility] 08 / SURGERY [facility]    Anesthesia Start:  1130 Anesthesia Stop:  1246    Procedure:  SALPINGECTOMY (Bilateral Vagina ) Diagnosis:  (PERMANENT STERILIZATION)    Surgeon:  Luis Obregon M.D. Responsible Provider:  Jayesh Alicea M.D.    Anesthesia Type:  general ASA Status:  2          Final Anesthesia Type: general  Last vitals  BP   Blood Pressure: 129/75    Temp   36.2 °C (97.1 °F)    Pulse   Pulse: (!) 48   Resp   14    SpO2   98 %      Anesthesia Post Evaluation    Patient location during evaluation: PACU  Patient participation: complete - patient participated  Level of consciousness: awake and alert  Pain score: 4    Airway patency: patent  Anesthetic complications: no  Cardiovascular status: hemodynamically stable  Respiratory status: acceptable  Hydration status: euvolemic    PONV: none           Nurse Pain Score: 4 (NPRS)

## 2019-08-26 LAB — PATHOLOGY CONSULT NOTE: NORMAL

## (undated) DEVICE — LACTATED RINGERS INJ 1000 ML - (14EA/CA 60CA/PF)

## (undated) DEVICE — PAD SANITARY 11IN MAXI IND WRAPPED  (12EA/PK 24PK/CA)

## (undated) DEVICE — ELECTRODE 850 FOAM ADHESIVE - HYDROGEL RADIOTRNSPRNT (50/PK)

## (undated) DEVICE — ELECTRODE DUAL RETURN W/ CORD - (50/PK)

## (undated) DEVICE — FORCEP BIPO CUTTING (EVEREST) - 5MM (5EA/BX)

## (undated) DEVICE — SET LEADWIRE 5 LEAD BEDSIDE DISPOSABLE ECG (1SET OF 5/EA)

## (undated) DEVICE — SET EXTENSION WITH 2 PORTS (48EA/CA) ***PART #2C8610 IS A SUBSTITUTE*****

## (undated) DEVICE — GLOVE BIOGEL SZ 7.5 SURGICAL PF LTX - (50PR/BX 4BX/CA)

## (undated) DEVICE — SYRINGE DISP. 12 CC LL - (100/BX)

## (undated) DEVICE — KIT ROOM DECONTAMINATION

## (undated) DEVICE — SET SUCTION/IRRIGATION WITH DISPOSABLE TIP (6/CA )PART #0250-070-520 IS A SUB

## (undated) DEVICE — SLEEVE, VASO, THIGH, MED

## (undated) DEVICE — PROTECTOR ULNA NERVE - (36PR/CA)

## (undated) DEVICE — HEAD HOLDER JUNIOR/ADULT

## (undated) DEVICE — GLOVE BIOGEL ECLIPSE PF LATEX SIZE 7.5

## (undated) DEVICE — SUTURE 4-0 MONOCRYL PLUS PS-2 - 27 INCH (36/BX)

## (undated) DEVICE — NEEDLE INSUFFLATION FOR STEP - (12/BX)

## (undated) DEVICE — SUTURE GENERAL

## (undated) DEVICE — BANDAID SHEER STRIP 3/4 IN (100EA/BX 12BX/CA)

## (undated) DEVICE — CANISTER SUCTION 3000ML MECHANICAL FILTER AUTO SHUTOFF MEDI-VAC NONSTERILE LF DISP  (40EA/CA)

## (undated) DEVICE — PACK LAPAROSCOPY - (1/CA)

## (undated) DEVICE — SODIUM CHL IRRIGATION 0.9% 1000ML (12EA/CA)

## (undated) DEVICE — SUCTION INSTRUMENT YANKAUER BULBOUS TIP W/O VENT (50EA/CA)

## (undated) DEVICE — SUTURE 0 VICRYL PLUS UR-6 - 27 INCH (36/BX)

## (undated) DEVICE — TROCAR STEP 5MM - (3/CA)

## (undated) DEVICE — KIT ANESTHESIA W/CIRCUIT & 3/LT BAG W/FILTER (20EA/CA)

## (undated) DEVICE — TUBING CLEARLINK DUO-VENT - C-FLO (48EA/CA)

## (undated) DEVICE — GOWN WARMING STANDARD FLEX - (30/CA)

## (undated) DEVICE — MASK ANESTHESIA ADULT  - (100/CA)

## (undated) DEVICE — BAG RETRIEVAL 10ML (10EA/BX)

## (undated) DEVICE — NEPTUNE 4 PORT MANIFOLD - (20/PK)

## (undated) DEVICE — SENSOR SPO2 NEO LNCS ADHESIVE (20/BX) SEE USER NOTES